# Patient Record
Sex: FEMALE | Race: WHITE | NOT HISPANIC OR LATINO | Employment: FULL TIME | URBAN - METROPOLITAN AREA
[De-identification: names, ages, dates, MRNs, and addresses within clinical notes are randomized per-mention and may not be internally consistent; named-entity substitution may affect disease eponyms.]

---

## 2024-12-01 ENCOUNTER — APPOINTMENT (OUTPATIENT)
Dept: CT IMAGING | Facility: HOSPITAL | Age: 44
DRG: 439 | End: 2024-12-01
Payer: COMMERCIAL

## 2024-12-01 ENCOUNTER — HOSPITAL ENCOUNTER (INPATIENT)
Facility: HOSPITAL | Age: 44
LOS: 5 days | Discharge: HOME | DRG: 439 | End: 2024-12-06
Attending: STUDENT IN AN ORGANIZED HEALTH CARE EDUCATION/TRAINING PROGRAM | Admitting: HOSPITALIST
Payer: COMMERCIAL

## 2024-12-01 DIAGNOSIS — K85.90 ACUTE PANCREATITIS, UNSPECIFIED COMPLICATION STATUS, UNSPECIFIED PANCREATITIS TYPE: Primary | ICD-10-CM

## 2024-12-01 PROBLEM — I10 HTN (HYPERTENSION): Status: ACTIVE | Noted: 2024-12-01

## 2024-12-01 PROBLEM — E66.01 OBESITY, CLASS III, BMI 40-49.9 (MORBID OBESITY): Status: ACTIVE | Noted: 2024-12-01

## 2024-12-01 LAB
ALBUMIN SERPL-MCNC: 3.7 G/DL (ref 3.5–5.2)
ALBUMIN/GLOB SERPL: 1.2 G/DL
ALP SERPL-CCNC: 177 U/L (ref 39–117)
ALT SERPL W P-5'-P-CCNC: 181 U/L (ref 1–33)
ANION GAP SERPL CALCULATED.3IONS-SCNC: 9.3 MMOL/L (ref 5–15)
AST SERPL-CCNC: 212 U/L (ref 1–32)
BASOPHILS # BLD AUTO: 0.02 10*3/MM3 (ref 0–0.2)
BASOPHILS NFR BLD AUTO: 0.2 % (ref 0–1.5)
BILIRUB SERPL-MCNC: 1.5 MG/DL (ref 0–1.2)
BUN SERPL-MCNC: 10 MG/DL (ref 6–20)
BUN/CREAT SERPL: 11.5 (ref 7–25)
CALCIUM SPEC-SCNC: 9.1 MG/DL (ref 8.6–10.5)
CHLORIDE SERPL-SCNC: 102 MMOL/L (ref 98–107)
CO2 SERPL-SCNC: 25.7 MMOL/L (ref 22–29)
CREAT SERPL-MCNC: 0.87 MG/DL (ref 0.57–1)
DEPRECATED RDW RBC AUTO: 42.1 FL (ref 37–54)
EGFRCR SERPLBLD CKD-EPI 2021: 84.9 ML/MIN/1.73
EOSINOPHIL # BLD AUTO: 0.01 10*3/MM3 (ref 0–0.4)
EOSINOPHIL NFR BLD AUTO: 0.1 % (ref 0.3–6.2)
ERYTHROCYTE [DISTWIDTH] IN BLOOD BY AUTOMATED COUNT: 12.3 % (ref 12.3–15.4)
GLOBULIN UR ELPH-MCNC: 3.1 GM/DL
GLUCOSE SERPL-MCNC: 120 MG/DL (ref 65–99)
HCT VFR BLD AUTO: 45.3 % (ref 34–46.6)
HGB BLD-MCNC: 14.9 G/DL (ref 12–15.9)
IMM GRANULOCYTES # BLD AUTO: 0.03 10*3/MM3 (ref 0–0.05)
IMM GRANULOCYTES NFR BLD AUTO: 0.3 % (ref 0–0.5)
LIPASE SERPL-CCNC: 2527 U/L (ref 13–60)
LYMPHOCYTES # BLD AUTO: 1.48 10*3/MM3 (ref 0.7–3.1)
LYMPHOCYTES NFR BLD AUTO: 14.8 % (ref 19.6–45.3)
MCH RBC QN AUTO: 30.2 PG (ref 26.6–33)
MCHC RBC AUTO-ENTMCNC: 32.9 G/DL (ref 31.5–35.7)
MCV RBC AUTO: 91.7 FL (ref 79–97)
MONOCYTES # BLD AUTO: 0.49 10*3/MM3 (ref 0.1–0.9)
MONOCYTES NFR BLD AUTO: 4.9 % (ref 5–12)
NEUTROPHILS NFR BLD AUTO: 7.98 10*3/MM3 (ref 1.7–7)
NEUTROPHILS NFR BLD AUTO: 79.7 % (ref 42.7–76)
PLATELET # BLD AUTO: 340 10*3/MM3 (ref 140–450)
PMV BLD AUTO: 10 FL (ref 6–12)
POTASSIUM SERPL-SCNC: 3.5 MMOL/L (ref 3.5–5.2)
PROT SERPL-MCNC: 6.8 G/DL (ref 6–8.5)
RBC # BLD AUTO: 4.94 10*6/MM3 (ref 3.77–5.28)
SODIUM SERPL-SCNC: 137 MMOL/L (ref 136–145)
WBC NRBC COR # BLD AUTO: 10.01 10*3/MM3 (ref 3.4–10.8)

## 2024-12-01 PROCEDURE — 99285 EMERGENCY DEPT VISIT HI MDM: CPT

## 2024-12-01 PROCEDURE — 25010000002 HYDROMORPHONE PER 4 MG: Performed by: NURSE PRACTITIONER

## 2024-12-01 PROCEDURE — 85025 COMPLETE CBC W/AUTO DIFF WBC: CPT | Performed by: NURSE PRACTITIONER

## 2024-12-01 PROCEDURE — 74176 CT ABD & PELVIS W/O CONTRAST: CPT

## 2024-12-01 PROCEDURE — 25810000003 SODIUM CHLORIDE 0.9 % SOLUTION: Performed by: HOSPITALIST

## 2024-12-01 PROCEDURE — 83690 ASSAY OF LIPASE: CPT | Performed by: NURSE PRACTITIONER

## 2024-12-01 PROCEDURE — 25010000002 ONDANSETRON PER 1 MG: Performed by: NURSE PRACTITIONER

## 2024-12-01 PROCEDURE — 99285 EMERGENCY DEPT VISIT HI MDM: CPT | Performed by: NURSE PRACTITIONER

## 2024-12-01 PROCEDURE — 25010000002 ONDANSETRON PER 1 MG: Performed by: HOSPITALIST

## 2024-12-01 PROCEDURE — 25010000002 HYDROMORPHONE PER 4 MG: Performed by: HOSPITALIST

## 2024-12-01 PROCEDURE — 80053 COMPREHEN METABOLIC PANEL: CPT | Performed by: NURSE PRACTITIONER

## 2024-12-01 PROCEDURE — 36415 COLL VENOUS BLD VENIPUNCTURE: CPT

## 2024-12-01 RX ORDER — LOSARTAN POTASSIUM 100 MG/1
100 TABLET ORAL DAILY
COMMUNITY

## 2024-12-01 RX ORDER — HYDROMORPHONE HYDROCHLORIDE 1 MG/ML
0.5 INJECTION, SOLUTION INTRAMUSCULAR; INTRAVENOUS; SUBCUTANEOUS
Status: DISCONTINUED | OUTPATIENT
Start: 2024-12-01 | End: 2024-12-06 | Stop reason: HOSPADM

## 2024-12-01 RX ORDER — BISACODYL 5 MG/1
5 TABLET, DELAYED RELEASE ORAL DAILY PRN
Status: DISCONTINUED | OUTPATIENT
Start: 2024-12-01 | End: 2024-12-06 | Stop reason: HOSPADM

## 2024-12-01 RX ORDER — ACETAMINOPHEN 650 MG/1
650 SUPPOSITORY RECTAL EVERY 4 HOURS PRN
Status: DISCONTINUED | OUTPATIENT
Start: 2024-12-01 | End: 2024-12-06 | Stop reason: HOSPADM

## 2024-12-01 RX ORDER — HYDROCHLOROTHIAZIDE 12.5 MG/1
12.5 TABLET ORAL DAILY
COMMUNITY

## 2024-12-01 RX ORDER — PANTOPRAZOLE SODIUM 40 MG/1
40 TABLET, DELAYED RELEASE ORAL DAILY
Status: DISCONTINUED | OUTPATIENT
Start: 2024-12-01 | End: 2024-12-04

## 2024-12-01 RX ORDER — ONDANSETRON 2 MG/ML
4 INJECTION INTRAMUSCULAR; INTRAVENOUS ONCE
Status: COMPLETED | OUTPATIENT
Start: 2024-12-01 | End: 2024-12-01

## 2024-12-01 RX ORDER — PANTOPRAZOLE SODIUM 20 MG/1
20 TABLET, DELAYED RELEASE ORAL DAILY
Status: DISCONTINUED | OUTPATIENT
Start: 2024-12-01 | End: 2024-12-01 | Stop reason: CLARIF

## 2024-12-01 RX ORDER — AMOXICILLIN 250 MG
2 CAPSULE ORAL 2 TIMES DAILY PRN
Status: DISCONTINUED | OUTPATIENT
Start: 2024-12-01 | End: 2024-12-06 | Stop reason: HOSPADM

## 2024-12-01 RX ORDER — HYDROMORPHONE HYDROCHLORIDE 1 MG/ML
0.5 INJECTION, SOLUTION INTRAMUSCULAR; INTRAVENOUS; SUBCUTANEOUS ONCE
Status: COMPLETED | OUTPATIENT
Start: 2024-12-01 | End: 2024-12-01

## 2024-12-01 RX ORDER — SODIUM CHLORIDE 9 MG/ML
100 INJECTION, SOLUTION INTRAVENOUS CONTINUOUS
Status: ACTIVE | OUTPATIENT
Start: 2024-12-01 | End: 2024-12-02

## 2024-12-01 RX ORDER — ONDANSETRON 4 MG/1
4 TABLET, ORALLY DISINTEGRATING ORAL EVERY 6 HOURS PRN
Status: DISCONTINUED | OUTPATIENT
Start: 2024-12-01 | End: 2024-12-06 | Stop reason: HOSPADM

## 2024-12-01 RX ORDER — POLYETHYLENE GLYCOL 3350 17 G/17G
17 POWDER, FOR SOLUTION ORAL DAILY PRN
Status: DISCONTINUED | OUTPATIENT
Start: 2024-12-01 | End: 2024-12-06 | Stop reason: HOSPADM

## 2024-12-01 RX ORDER — SODIUM CHLORIDE 0.9 % (FLUSH) 0.9 %
10 SYRINGE (ML) INJECTION AS NEEDED
Status: DISCONTINUED | OUTPATIENT
Start: 2024-12-01 | End: 2024-12-06 | Stop reason: HOSPADM

## 2024-12-01 RX ORDER — ALUMINA, MAGNESIA, AND SIMETHICONE 2400; 2400; 240 MG/30ML; MG/30ML; MG/30ML
30 SUSPENSION ORAL ONCE
Status: COMPLETED | OUTPATIENT
Start: 2024-12-01 | End: 2024-12-01

## 2024-12-01 RX ORDER — SODIUM CHLORIDE 9 MG/ML
40 INJECTION, SOLUTION INTRAVENOUS AS NEEDED
Status: DISCONTINUED | OUTPATIENT
Start: 2024-12-01 | End: 2024-12-06 | Stop reason: HOSPADM

## 2024-12-01 RX ORDER — BISACODYL 10 MG
10 SUPPOSITORY, RECTAL RECTAL DAILY PRN
Status: DISCONTINUED | OUTPATIENT
Start: 2024-12-01 | End: 2024-12-06 | Stop reason: HOSPADM

## 2024-12-01 RX ORDER — NALOXONE HCL 0.4 MG/ML
0.4 VIAL (ML) INJECTION
Status: DISCONTINUED | OUTPATIENT
Start: 2024-12-01 | End: 2024-12-06 | Stop reason: HOSPADM

## 2024-12-01 RX ORDER — SODIUM CHLORIDE 0.9 % (FLUSH) 0.9 %
10 SYRINGE (ML) INJECTION EVERY 12 HOURS SCHEDULED
Status: DISCONTINUED | OUTPATIENT
Start: 2024-12-01 | End: 2024-12-06 | Stop reason: HOSPADM

## 2024-12-01 RX ORDER — ACETAMINOPHEN 160 MG/5ML
650 SOLUTION ORAL EVERY 4 HOURS PRN
Status: DISCONTINUED | OUTPATIENT
Start: 2024-12-01 | End: 2024-12-06 | Stop reason: HOSPADM

## 2024-12-01 RX ORDER — ACETAMINOPHEN 325 MG/1
650 TABLET ORAL EVERY 4 HOURS PRN
Status: DISCONTINUED | OUTPATIENT
Start: 2024-12-01 | End: 2024-12-06 | Stop reason: HOSPADM

## 2024-12-01 RX ORDER — TIRZEPATIDE 2.5 MG/.5ML
2.5 INJECTION, SOLUTION SUBCUTANEOUS WEEKLY
COMMUNITY

## 2024-12-01 RX ORDER — ONDANSETRON 2 MG/ML
4 INJECTION INTRAMUSCULAR; INTRAVENOUS EVERY 6 HOURS PRN
Status: DISCONTINUED | OUTPATIENT
Start: 2024-12-01 | End: 2024-12-06 | Stop reason: HOSPADM

## 2024-12-01 RX ADMIN — HYDROMORPHONE HYDROCHLORIDE 0.5 MG: 1 INJECTION, SOLUTION INTRAMUSCULAR; INTRAVENOUS; SUBCUTANEOUS at 17:36

## 2024-12-01 RX ADMIN — ONDANSETRON 4 MG: 2 INJECTION, SOLUTION INTRAMUSCULAR; INTRAVENOUS at 17:28

## 2024-12-01 RX ADMIN — ALUMINUM HYDROXIDE, MAGNESIUM HYDROXIDE, AND DIMETHICONE 30 ML: 400; 400; 40 SUSPENSION ORAL at 10:49

## 2024-12-01 RX ADMIN — SODIUM CHLORIDE 100 ML/HR: 9 INJECTION, SOLUTION INTRAVENOUS at 15:20

## 2024-12-01 RX ADMIN — ONDANSETRON 4 MG: 2 INJECTION, SOLUTION INTRAMUSCULAR; INTRAVENOUS at 12:02

## 2024-12-01 RX ADMIN — HYDROMORPHONE HYDROCHLORIDE 0.5 MG: 1 INJECTION, SOLUTION INTRAMUSCULAR; INTRAVENOUS; SUBCUTANEOUS at 11:59

## 2024-12-01 RX ADMIN — HYDROMORPHONE HYDROCHLORIDE 0.5 MG: 1 INJECTION, SOLUTION INTRAMUSCULAR; INTRAVENOUS; SUBCUTANEOUS at 15:20

## 2024-12-01 RX ADMIN — PANTOPRAZOLE SODIUM 40 MG: 40 TABLET, DELAYED RELEASE ORAL at 20:02

## 2024-12-01 NOTE — FSED PROVIDER NOTE
Subjective   History of Present Illness  44 yr old female presents with C/O ABD pain.  She has been having ABD pain off and on for several months.  Her gallbladder has already been removed, but it feels like the pain she had with her gallbladder.   Seen in ER 11 days ago for same complaint.  States that pain is getting worse.  Has not made appointment yet with GI doctor. Has nausea and vomiting off and on.  Denies fever or chills.  No Diarrhea.  States GI cocktail given previously in ER helped some.  Pain in mid epigastric region and goes through to her back.  Feels tired.  Can't get comfortable.  Pt is on monjaro, but stated she has had these GI issues before she started on it.        Review of Systems   Constitutional: Negative.    HENT: Negative.     Respiratory: Negative.     Gastrointestinal:  Positive for abdominal pain, nausea and vomiting. Negative for diarrhea.   Genitourinary: Negative.        History reviewed. No pertinent past medical history.    Allergies   Allergen Reactions    Penicillins Hives       Past Surgical History:   Procedure Laterality Date    CHOLECYSTECTOMY         History reviewed. No pertinent family history.    Social History     Socioeconomic History    Marital status:    Tobacco Use    Smoking status: Never   Substance and Sexual Activity    Alcohol use: Not Currently           Objective   Physical Exam  Vitals and nursing note reviewed.   Constitutional:       Appearance: She is normal weight.   Pulmonary:      Effort: Pulmonary effort is normal.      Breath sounds: Normal breath sounds and air entry.   Abdominal:      General: Abdomen is protuberant. Bowel sounds are normal.      Palpations: Abdomen is soft.      Tenderness: There is abdominal tenderness in the epigastric area. There is no right CVA tenderness, left CVA tenderness, guarding or rebound. Negative signs include Schmidt's sign and Rovsing's sign.   Musculoskeletal:        Back:       Comments: Pain is in mid back,  but not tender and no pain with palation.   Skin:     General: Skin is warm and dry.      Capillary Refill: Capillary refill takes less than 2 seconds.   Neurological:      Mental Status: She is alert.         Procedures           ED Course  ED Course as of 12/01/24 1907   Sun Dec 01, 2024   1336 Discussed findings with patient. [JJ]      ED Course User Index  [JJ] Linette Haro APRN      Decision to admit.  Dr Lee at [facility].                                     Medical Decision Making  Diff DX:  ABD pain, gastritis, kidney infection, appendicitis, pancrease, ulcer    Problems Addressed:  Acute pancreatitis, unspecified complication status, unspecified pancreatitis type: complicated acute illness or injury    Amount and/or Complexity of Data Reviewed  Labs: ordered.  Radiology: ordered.    Risk  OTC drugs.  Prescription drug management.  Decision regarding hospitalization.        Final diagnoses:   Acute pancreatitis, unspecified complication status, unspecified pancreatitis type       ED Disposition  ED Disposition       ED Disposition   Decision to Admit    Condition   --    Comment   Level of Care: Med/Surg [1]   Diagnosis: Pancreatitis [202663]   Admitting Physician: DARION LEE [[id number]]   Attending Physician: DARION LEE [[id number]]   Isolate for COVID?: No [0]   Certification: I Certify That Inpatient Hospital Services Are Medically Necessary For Greater Than 2 Midnights                 No follow-up provider specified.       Medication List      No changes were made to your prescriptions during this visit.

## 2024-12-01 NOTE — H&P
HISTORY AND PHYSICAL   [facility]        Patient Identification:  Name: Racquel Degroot  Age: 44 y.o.  Sex: female  :  1980  MRN: [id number]                     Primary Care Physician: Pretty Dean MD    Chief Complaint:  abdominal pain    History of Present Illness:       The patient 44 yr old female presents with complaining of ABD pain. She has been having ABD pain off and on for several months. Her gallbladder has already been removed, but it feels like the pain she had with her gallbladder. Seen in ER 11 days ago for same complaint. States that pain is getting worse. Has not made appointment yet with GI doctor. Has nausea and vomiting off and on. Denies fever or chills. No Diarrhea. States GI cocktail given previously in ER helped some. Pain in mid epigastric region and goes through to her back. Feels tired. Can't get comfortable. Pt is on monjaro, but stated she has had these GI issues before she started on it.  The patient was evaluated at the ER on [facility] and transferred to [facility] for further evaluation treatment with suspected pancreatitis with elevated lipase.  CT of the abdomen did show some dilated biliary ducts.  She reported that she did have stones when she had her gallbladder out but it has been many years ago.    Past Medical History:  History reviewed. No pertinent past medical history.  Past Surgical History:  Past Surgical History:   Procedure Laterality Date    CHOLECYSTECTOMY        Home Meds:  Medications Prior to Admission   Medication Sig Dispense Refill Last Dose/Taking    aluminum-magnesium hydroxide-simethicone (MAALOX) 200-200-20 MG/5ML suspension Take 30 mL by mouth 4 (Four) Times a Day Before Meals & at Bedtime. 1680 mL 0 Taking    hydroCHLOROthiazide 12.5 MG tablet Take 1 tablet by mouth Daily.   Taking    losartan (COZAAR) 100 MG tablet Take 1 tablet by mouth Daily.   Taking    Norgestim-Eth Estrad Triphasic (TRI-SPRINTEC PO)  Take  by mouth Daily.   Taking    pantoprazole (PROTONIX) 20 MG EC tablet Take 1 tablet by mouth Daily for 30 days. 30 tablet 0 Taking    Tirzepatide-Weight Management (Zepbound) 2.5 MG/0.5ML solution auto-injector Inject 0.5 mL under the skin into the appropriate area as directed 1 (One) Time Per Week. Takes on Friday   Taking     Current meds    Current Facility-Administered Medications:     acetaminophen (TYLENOL) tablet 650 mg, 650 mg, Oral, Q4H PRN **OR** acetaminophen (TYLENOL) 160 MG/5ML oral solution 650 mg, 650 mg, Oral, Q4H PRN **OR** acetaminophen (TYLENOL) suppository 650 mg, 650 mg, Rectal, Q4H PRN, Vitaly Lee MD    sennosides-docusate (PERICOLACE) 8.6-50 MG per tablet 2 tablet, 2 tablet, Oral, BID PRN **AND** polyethylene glycol (MIRALAX) packet 17 g, 17 g, Oral, Daily PRN **AND** bisacodyl (DULCOLAX) EC tablet 5 mg, 5 mg, Oral, Daily PRN **AND** bisacodyl (DULCOLAX) suppository 10 mg, 10 mg, Rectal, Daily PRN, Vitaly Lee MD    HYDROmorphone (DILAUDID) injection 0.5 mg, 0.5 mg, Intravenous, Q2H PRN, 0.5 mg at 12/01/24 1520 **AND** naloxone (NARCAN) injection 0.4 mg, 0.4 mg, Intravenous, Q5 Min PRN, Vitaly Lee MD    ondansetron ODT (ZOFRAN-ODT) disintegrating tablet 4 mg, 4 mg, Oral, Q6H PRN **OR** ondansetron (ZOFRAN) injection 4 mg, 4 mg, Intravenous, Q6H PRN, Vitaly Lee MD    pantoprazole (PROTONIX) EC tablet 20 mg, 20 mg, Oral, Daily, Vitaly Lee MD    sodium chloride 0.9 % flush 10 mL, 10 mL, Intravenous, Q12H, Vitaly Lee MD    sodium chloride 0.9 % flush 10 mL, 10 mL, Intravenous, PRN, Vitaly Lee MD    sodium chloride 0.9 % infusion 40 mL, 40 mL, Intravenous, PRN, Vitaly Lee MD    sodium chloride 0.9 % infusion, 100 mL/hr, Intravenous, Continuous, Vitaly Lee MD, Last Rate: 100 mL/hr at 12/01/24 1520, 100 mL/hr at 12/01/24 1520  Allergies:  Allergies   Allergen Reactions    Penicillins Hives     Immunizations:    There is no immunization history on file for this  "patient.  Social History:   Social History     Social History Narrative    Not on file     Social History     Socioeconomic History    Marital status:    Tobacco Use    Smoking status: Never   Substance and Sexual Activity    Alcohol use: Not Currently       Family History:  History reviewed. No pertinent family history.     Review of Systems  See history of present illness and past medical history.  Patient denies headache, dizziness, syncope, falls, trauma, change in vision, change in hearing, change in taste, changes in weight, changes in appetite, focal weakness, numbness, or paresthesia.  Patient denies chest pain, palpitations, dyspnea, orthopnea, PND, cough, sinus pressure, rhinorrhea, epistaxis, hemoptysis, nausea, vomiting, hematemesis, diarrhea, constipation or hematochezia. Denies cold or heat intolerance, polydipsia, polyuria, polyphagia. Denies hematuria, pyuria, dysuria, hesitancy, frequency or urgency.   Denies fever, chills, sweats, night sweats.  Denies missing any routine medications. Remainder of ROS is negative.    Objective:  tMax 24 hrs: Temp (24hrs), Av.9 °F (36.6 °C), Min:96.7 °F (35.9 °C), Max:98.7 °F (37.1 °C)    Vitals Ranges:   Temp:  [96.7 °F (35.9 °C)-98.7 °F (37.1 °C)] 96.7 °F (35.9 °C)  Heart Rate:  [65-99] 72  Resp:  [16-18] 18  BP: (147-152)/(80-99) 150/92      Exam:  /92 (BP Location: Left arm, Patient Position: Sitting)   Pulse 72   Temp 96.7 °F (35.9 °C) (Oral)   Resp 18   Ht 164.6 cm (64.8\")   Wt 127 kg (280 lb 13.9 oz)   LMP 10/24/2024   SpO2 97%   BMI 47.03 kg/m²     General Appearance:    Alert, cooperative, no distress, appears stated age   Head:    Normocephalic, without obvious abnormality, atraumatic   Eyes:    PERRL, conjunctivae/corneas clear, EOM's intact, both eyes   Ears:    Normal external ear canals, both ears   Nose:   Nares normal, septum midline, mucosa normal, no drainage    or sinus tenderness   Throat:   Lips, mucosa, and tongue " normal   Neck:   Supple, symmetrical, trachea midline, no adenopathy;     thyroid:  no enlargement/tenderness/nodules; no carotid    bruit or JVD   Back:     Symmetric, no curvature, ROM normal, no CVA tenderness   Lungs:     Clear to auscultation bilaterally, respirations unlabored   Chest Wall:    No tenderness or deformity    Heart:    Regular rate and rhythm, S1 and S2 normal, no murmur, rub   or gallop   Abdomen:     Soft, mild epigastric tenderness, bowel sounds active all four quadrants,     no masses, no hepatomegaly, no splenomegaly   Extremities:   Extremities normal, atraumatic, no cyanosis or edema   Pulses:   2+ and symmetric all extremities   Skin:   Skin color, texture, turgor normal, no rashes or lesions   Lymph nodes:   Cervical, supraclavicular, and axillary nodes normal   Neurologic:   CNII-XII intact, normal strength, sensation intact throughout      .    Data Review:  Lab Results (last 72 hours)       Procedure Component Value Units Date/Time    CBC & Differential [[id number]]  (Abnormal) Collected: 12/01/24 1205    Specimen: Blood Updated: 12/01/24 1208    Narrative:      The following orders were created for panel order CBC & Differential.  Procedure                               Abnormality         Status                     ---------                               -----------         ------                     CBC Auto Differential[[id number]]        Abnormal            Final result                 Please view results for these tests on the individual orders.    CBC Auto Differential [[id number]]  (Abnormal) Collected: 12/01/24 1205    Specimen: Blood Updated: 12/01/24 1208     WBC 10.01 10*3/mm3      RBC 4.94 10*6/mm3      Hemoglobin 14.9 g/dL      Hematocrit 45.3 %      MCV 91.7 fL      MCH 30.2 pg      MCHC 32.9 g/dL      RDW 12.3 %      RDW-SD 42.1 fl      MPV 10.0 fL      Platelets 340 10*3/mm3      Neutrophil % 79.7 %      Lymphocyte % 14.8 %      Monocyte % 4.9 %      Eosinophil % 0.1 %       Basophil % 0.2 %      Immature Grans % 0.3 %      Neutrophils, Absolute 7.98 10*3/mm3      Lymphocytes, Absolute 1.48 10*3/mm3      Monocytes, Absolute 0.49 10*3/mm3      Eosinophils, Absolute 0.01 10*3/mm3      Basophils, Absolute 0.02 10*3/mm3      Immature Grans, Absolute 0.03 10*3/mm3     Comprehensive Metabolic Panel [[id number]]  (Abnormal) Collected: 12/01/24 1035    Specimen: Blood Updated: 12/01/24 1055     Glucose 120 mg/dL      BUN 10 mg/dL      Creatinine 0.87 mg/dL      Sodium 137 mmol/L      Potassium 3.5 mmol/L      Chloride 102 mmol/L      CO2 25.7 mmol/L      Calcium 9.1 mg/dL      Total Protein 6.8 g/dL      Albumin 3.7 g/dL      ALT (SGPT) 181 U/L      AST (SGOT) 212 U/L      Alkaline Phosphatase 177 U/L      Total Bilirubin 1.5 mg/dL      Globulin 3.1 gm/dL      A/G Ratio 1.2 g/dL      BUN/Creatinine Ratio 11.5     Anion Gap 9.3 mmol/L      eGFR 84.9 mL/min/1.73     Narrative:      GFR Normal >60  Chronic Kidney Disease <60  Kidney Failure <15      Lipase [[id number]]  (Abnormal) Collected: 12/01/24 1035    Specimen: Blood Updated: 12/01/24 1055     Lipase 2,527 U/L                      Imaging Results (All)       Procedure Component Value Units Date/Time    CT Abdomen Pelvis Without Contrast [[id number]] Collected: 12/01/24 1137     Updated: 12/01/24 1151    Narrative:      CT ABDOMEN AND PELVIS WITHOUT IV CONTRAST     HISTORY: Abdominal pain     TECHNIQUE: Radiation dose reduction techniques were utilized, including  automated exposure control and exposure modulation based on body size.   3 mm images were obtained through the abdomen and pelvis without IV  contrast.     COMPARISON: None     FINDINGS:  Sub-6 mm pulm nodule in the right middle lobe. No findings of small  bowel obstruction. The appendix unremarkable.     The liver, pancreas, spleen, adrenal glands and kidneys have an  unremarkable noncontrast CT appearance. No hydronephrosis no abdominal  pelvic adenopathy by size criteria.  Bladder is decompressed and cannot  be evaluated. No free intraperitoneal air is seen. No suspicious lytic  or blastic osseous lesions       Impression:      1.  No measurable loculated peripancreatic fluid collection is seen.  However, please note evaluation is suboptimal without intravenous  contrast. Mild to moderate intra and extra medical duct dilation status  post cholecystectomy. While findings may be post surgical, they remain  indeterminant given patient's elevated liver enzymes and lipase levels  and biliary obstruction cannot be excluded. Further evaluation with  abdominal MRI and MRCP should be considered if there is clinical concern  for biliary obstruction..  2.  If this patient is at increased risk for lung cancer, follow-up  chest CT in 12 months can be considered to ensure stability. If this  patient is not at increased risk for lung cancer, no further follow-up  is necessary per Fleischner criteria.  3.  Other findings as above           This report was finalized on 12/1/2024 11:48 AM by Dr. Deni Marie M.D on Workstation: [id number]             History reviewed. No pertinent past medical history.    Assessment:  Active Hospital Problems    Diagnosis  POA    **Pancreatitis [K85.90]  Yes    HTN (hypertension) [I10]  Unknown    Obesity, Class III, BMI 40-49.9 (morbid obesity) [E66.01]  Unknown      Resolved Hospital Problems   No resolved problems to display.       Plan:  Will make her n.p.o. and give IV fluid for hydration with pain and nausea medicine.  Will get MRI MRCP of pancreas.  Follow-up on labs.  Will ask for GI consult.    Vitaly Lee MD  12/1/2024  15:43 EST

## 2024-12-02 ENCOUNTER — APPOINTMENT (OUTPATIENT)
Dept: MRI IMAGING | Facility: HOSPITAL | Age: 44
DRG: 439 | End: 2024-12-02
Payer: COMMERCIAL

## 2024-12-02 LAB
ALBUMIN SERPL-MCNC: 3.3 G/DL (ref 3.5–5.2)
ALBUMIN/GLOB SERPL: 1.2 G/DL
ALP SERPL-CCNC: 190 U/L (ref 39–117)
ALT SERPL W P-5'-P-CCNC: 271 U/L (ref 1–33)
ANION GAP SERPL CALCULATED.3IONS-SCNC: 8.7 MMOL/L (ref 5–15)
AST SERPL-CCNC: 357 U/L (ref 1–32)
BASOPHILS # BLD AUTO: 0.05 10*3/MM3 (ref 0–0.2)
BASOPHILS NFR BLD AUTO: 0.5 % (ref 0–1.5)
BILIRUB SERPL-MCNC: 3.2 MG/DL (ref 0–1.2)
BUN SERPL-MCNC: 9 MG/DL (ref 6–20)
BUN/CREAT SERPL: 10.6 (ref 7–25)
CALCIUM SPEC-SCNC: 8.5 MG/DL (ref 8.6–10.5)
CHLORIDE SERPL-SCNC: 106 MMOL/L (ref 98–107)
CO2 SERPL-SCNC: 25.3 MMOL/L (ref 22–29)
CREAT SERPL-MCNC: 0.85 MG/DL (ref 0.57–1)
DEPRECATED RDW RBC AUTO: 39.6 FL (ref 37–54)
EGFRCR SERPLBLD CKD-EPI 2021: 87.3 ML/MIN/1.73
EOSINOPHIL # BLD AUTO: 0.11 10*3/MM3 (ref 0–0.4)
EOSINOPHIL NFR BLD AUTO: 1.2 % (ref 0.3–6.2)
ERYTHROCYTE [DISTWIDTH] IN BLOOD BY AUTOMATED COUNT: 12.4 % (ref 12.3–15.4)
GLOBULIN UR ELPH-MCNC: 2.8 GM/DL
GLUCOSE SERPL-MCNC: 120 MG/DL (ref 65–99)
HCT VFR BLD AUTO: 39.7 % (ref 34–46.6)
HGB BLD-MCNC: 13.7 G/DL (ref 12–15.9)
IMM GRANULOCYTES # BLD AUTO: 0.02 10*3/MM3 (ref 0–0.05)
IMM GRANULOCYTES NFR BLD AUTO: 0.2 % (ref 0–0.5)
LIPASE SERPL-CCNC: 1846 U/L (ref 13–60)
LYMPHOCYTES # BLD AUTO: 2.5 10*3/MM3 (ref 0.7–3.1)
LYMPHOCYTES NFR BLD AUTO: 27 % (ref 19.6–45.3)
MCH RBC QN AUTO: 30.2 PG (ref 26.6–33)
MCHC RBC AUTO-ENTMCNC: 34.5 G/DL (ref 31.5–35.7)
MCV RBC AUTO: 87.6 FL (ref 79–97)
MONOCYTES # BLD AUTO: 0.56 10*3/MM3 (ref 0.1–0.9)
MONOCYTES NFR BLD AUTO: 6.1 % (ref 5–12)
NEUTROPHILS NFR BLD AUTO: 6.01 10*3/MM3 (ref 1.7–7)
NEUTROPHILS NFR BLD AUTO: 65 % (ref 42.7–76)
NRBC BLD AUTO-RTO: 0 /100 WBC (ref 0–0.2)
PLATELET # BLD AUTO: 323 10*3/MM3 (ref 140–450)
PMV BLD AUTO: 9.2 FL (ref 6–12)
POTASSIUM SERPL-SCNC: 3.3 MMOL/L (ref 3.5–5.2)
PROT SERPL-MCNC: 6.1 G/DL (ref 6–8.5)
RBC # BLD AUTO: 4.53 10*6/MM3 (ref 3.77–5.28)
SODIUM SERPL-SCNC: 140 MMOL/L (ref 136–145)
WBC NRBC COR # BLD AUTO: 9.25 10*3/MM3 (ref 3.4–10.8)

## 2024-12-02 PROCEDURE — 83690 ASSAY OF LIPASE: CPT | Performed by: HOSPITALIST

## 2024-12-02 PROCEDURE — 85025 COMPLETE CBC W/AUTO DIFF WBC: CPT | Performed by: HOSPITALIST

## 2024-12-02 PROCEDURE — 25010000002 PROCHLORPERAZINE 10 MG/2ML SOLUTION: Performed by: HOSPITALIST

## 2024-12-02 PROCEDURE — 25810000003 SODIUM CHLORIDE 0.9 % SOLUTION: Performed by: HOSPITALIST

## 2024-12-02 PROCEDURE — 80053 COMPREHEN METABOLIC PANEL: CPT | Performed by: HOSPITALIST

## 2024-12-02 PROCEDURE — 25010000002 ONDANSETRON PER 1 MG: Performed by: HOSPITALIST

## 2024-12-02 PROCEDURE — 25510000002 GADOBENATE DIMEGLUMINE 529 MG/ML SOLUTION: Performed by: HOSPITALIST

## 2024-12-02 PROCEDURE — A9577 INJ MULTIHANCE: HCPCS | Performed by: HOSPITALIST

## 2024-12-02 PROCEDURE — 99222 1ST HOSP IP/OBS MODERATE 55: CPT | Performed by: PHYSICIAN ASSISTANT

## 2024-12-02 PROCEDURE — 74183 MRI ABD W/O CNTR FLWD CNTR: CPT

## 2024-12-02 PROCEDURE — 25010000002 HYDROMORPHONE PER 4 MG: Performed by: HOSPITALIST

## 2024-12-02 RX ORDER — PROCHLORPERAZINE EDISYLATE 5 MG/ML
10 INJECTION INTRAMUSCULAR; INTRAVENOUS EVERY 6 HOURS PRN
Status: DISCONTINUED | OUTPATIENT
Start: 2024-12-02 | End: 2024-12-06 | Stop reason: HOSPADM

## 2024-12-02 RX ORDER — SODIUM CHLORIDE 9 MG/ML
100 INJECTION, SOLUTION INTRAVENOUS CONTINUOUS
Status: DISCONTINUED | OUTPATIENT
Start: 2024-12-02 | End: 2024-12-06 | Stop reason: HOSPADM

## 2024-12-02 RX ADMIN — HYDROMORPHONE HYDROCHLORIDE 0.5 MG: 1 INJECTION, SOLUTION INTRAMUSCULAR; INTRAVENOUS; SUBCUTANEOUS at 11:32

## 2024-12-02 RX ADMIN — Medication 10 ML: at 20:10

## 2024-12-02 RX ADMIN — SODIUM CHLORIDE 100 ML/HR: 9 INJECTION, SOLUTION INTRAVENOUS at 14:01

## 2024-12-02 RX ADMIN — ONDANSETRON 4 MG: 2 INJECTION, SOLUTION INTRAMUSCULAR; INTRAVENOUS at 14:57

## 2024-12-02 RX ADMIN — HYDROMORPHONE HYDROCHLORIDE 0.5 MG: 1 INJECTION, SOLUTION INTRAMUSCULAR; INTRAVENOUS; SUBCUTANEOUS at 03:16

## 2024-12-02 RX ADMIN — HYDROMORPHONE HYDROCHLORIDE 0.5 MG: 1 INJECTION, SOLUTION INTRAMUSCULAR; INTRAVENOUS; SUBCUTANEOUS at 06:25

## 2024-12-02 RX ADMIN — HYDROMORPHONE HYDROCHLORIDE 0.5 MG: 1 INJECTION, SOLUTION INTRAMUSCULAR; INTRAVENOUS; SUBCUTANEOUS at 14:02

## 2024-12-02 RX ADMIN — ONDANSETRON 4 MG: 2 INJECTION, SOLUTION INTRAMUSCULAR; INTRAVENOUS at 11:32

## 2024-12-02 RX ADMIN — ONDANSETRON 4 MG: 2 INJECTION, SOLUTION INTRAMUSCULAR; INTRAVENOUS at 06:20

## 2024-12-02 RX ADMIN — PROCHLORPERAZINE EDISYLATE 10 MG: 5 INJECTION INTRAMUSCULAR; INTRAVENOUS at 18:18

## 2024-12-02 RX ADMIN — GADOBENATE DIMEGLUMINE 20 ML: 529 INJECTION, SOLUTION INTRAVENOUS at 15:52

## 2024-12-02 RX ADMIN — PANTOPRAZOLE SODIUM 40 MG: 40 TABLET, DELAYED RELEASE ORAL at 09:07

## 2024-12-02 RX ADMIN — Medication 10 ML: at 09:07

## 2024-12-02 NOTE — CONSULTS
[facility]  Initial Inpatient Consult Note    Referring Provider: A    Reason for Consultation: Abdominal pain    Subjective     History of present illness:    44 y.o. female with a past medical history of hypertension who presents to [facility] ED in the setting of abdominal pain.    Surgical history includes cholecystectomy.    Patient reports she has been experiencing pain in her epigastric region on and off for the last year.  Patient reports she had some worsening symptoms approximately 1 to 2 weeks ago prompting her to go to the ED.  She was recommended to take pantoprazole daily and Maalox up to 3 times a day and was discharged home.  Patient reports she had minimal improvement with this regimen.  Patient describes the pain as sharp and occasionally burning with radiation to her back.  She reports it worsened over the weekend prompting her to come to the emergency room.  She did experience some nausea, vomiting, diarrhea at this time.  Patient has been on Zepbound for approximately the last 6 weeks.  She denies any abnormal weight loss.  She denies any alcohol use.  Her last dose of Mounjaro was this past Friday.  She reports NSAID use sparingly.    CT abdomen pelvis without IV contrast reviewed with no obvious loculated peripancreatic fluid but limited by lack of contrast.  Mild to moderate intra and extra-hepatic duct dilation S/P cholecystectomy.  Findings may be postsurgical but MRCP recommended.    Labs reviewed with elevated , , , T. bili 1.5, lipase 2527.  Normal white blood cell count.  Worsening LFTs this morning.    Patient does report her pain is improved at this morning.  No further nausea or vomiting.    Past Medical History:  History reviewed. No pertinent past medical history.  Past Surgical History:  Past Surgical History:   Procedure Laterality Date    CHOLECYSTECTOMY        Social History:   Social History     Tobacco Use    Smoking status: Never     Smokeless tobacco: Not on file   Substance Use Topics    Alcohol use: Not Currently      Family History:  History reviewed. No pertinent family history.    Home Meds:  Medications Prior to Admission   Medication Sig Dispense Refill Last Dose/Taking    aluminum-magnesium hydroxide-simethicone (MAALOX) 200-200-20 MG/5ML suspension Take 30 mL by mouth 4 (Four) Times a Day Before Meals & at Bedtime. 1680 mL 0 Taking    hydroCHLOROthiazide 12.5 MG tablet Take 1 tablet by mouth Daily.   Taking    losartan (COZAAR) 100 MG tablet Take 1 tablet by mouth Daily.   Taking    Norgestim-Eth Estrad Triphasic (TRI-SPRINTEC PO) Take  by mouth Daily.   Taking    pantoprazole (PROTONIX) 20 MG EC tablet Take 1 tablet by mouth Daily for 30 days. 30 tablet 0 Taking    Tirzepatide-Weight Management (Zepbound) 2.5 MG/0.5ML solution auto-injector Inject 0.5 mL under the skin into the appropriate area as directed 1 (One) Time Per Week. Takes on Friday   Taking     Current Meds:   pantoprazole, 40 mg, Oral, Daily  sodium chloride, 10 mL, Intravenous, Q12H      Allergies:  Allergies   Allergen Reactions    Penicillins Hives       Objective     Vital Signs  Temp:  [96.7 °F (35.9 °C)-98.7 °F (37.1 °C)] 97.4 °F (36.3 °C)  Heart Rate:  [65-99] 74  Resp:  [16-18] 16  BP: (119-152)/(73-99) 119/73  Physical Exam:  General Appearance:     Alert, cooperative, in no acute distress   Abdomen:     Normal bowel sounds, no masses, no organomegaly, soft     nontender, nondistended, no guarding, no rebound                 tenderness   Rectal:     Deferred       Results Review:   I reviewed the patient's new clinical results.  I reviewed the patient's new imaging results and agree with the interpretation.    Results from last 7 days   Lab Units 12/02/24  0700 12/01/24  1205   WBC 10*3/mm3 9.25 10.01   HEMOGLOBIN g/dL 13.7 14.9   HEMATOCRIT % 39.7 45.3   PLATELETS 10*3/mm3 323 340     Results from last 7 days   Lab Units 12/02/24  0700 12/01/24  1035   SODIUM  mmol/L 140 137   POTASSIUM mmol/L 3.3* 3.5   CHLORIDE mmol/L 106 102   CO2 mmol/L 25.3 25.7   BUN mg/dL 9 10   CREATININE mg/dL 0.85 0.87   CALCIUM mg/dL 8.5* 9.1   BILIRUBIN mg/dL 3.2* 1.5*   ALK PHOS U/L 190* 177*   ALT (SGPT) U/L 271* 181*   AST (SGOT) U/L 357* 212*   GLUCOSE mg/dL 120* 120*         Lab Results   Lab Value Date/Time    LIPASE 1,846 (H) 12/02/2024 0700    LIPASE 2,527 (H) 12/01/2024 1035       Radiology:  CT Abdomen Pelvis Without Contrast   Final Result   1.  No measurable loculated peripancreatic fluid collection is seen.   However, please note evaluation is suboptimal without intravenous   contrast. Mild to moderate intra and extra medical duct dilation status   post cholecystectomy. While findings may be post surgical, they remain   indeterminant given patient's elevated liver enzymes and lipase levels   and biliary obstruction cannot be excluded. Further evaluation with   abdominal MRI and MRCP should be considered if there is clinical concern   for biliary obstruction..   2.  If this patient is at increased risk for lung cancer, follow-up   chest CT in 12 months can be considered to ensure stability. If this   patient is not at increased risk for lung cancer, no further follow-up   is necessary per Fleischner criteria.   3.  Other findings as above               This report was finalized on 12/1/2024 11:48 AM by Dr. Deni Marie M.D on Workstation: [id number]          MRI abdomen w wo contrast mrcp    (Results Pending)       Assessment & Plan   Assessment:   Epigastric abdominal pain  Elevated lipase - findings consistent with acute pancreatitis   Elevated LFTs   CT with mild to moderate intra and extra hepatic ductal dilation s/p CCY   Mounjaro therapy     Plan:   Continue supportive care   Await MRCP to r/o biliary obstruction  Likely biliary etiology but medication induced pancreatitis is a possibility - patient is on both mounjaro and HCTZ   NPO for now    I discussed the patients  findings and my recommendations with patient.    Dictated utilizing Dragon dictation.         Latonia Joaquin PA-C   [facility]  [address] [place] [address]  Office: [phone]

## 2024-12-02 NOTE — PROGRESS NOTES
Continued Stay Note  [facility]     Patient Name: Racquel Degroot  MRN: [id number]  Today's Date: 12/2/2024    Admit Date: 12/1/2024    Plan: Home no needs   Discharge Plan       Row Name 12/02/24 1430       Plan    Plan Home no needs    Plan Comments Introduced self and role of CCP. Patient confirmed DC plan is to return to home. Stated she is independent with ADL's and uses no DMEs. Family will assist as needed and will provide transportation at DC. Denies any needs/equipment.                   Discharge Codes    No documentation.                       Jadyn Mahoney RN

## 2024-12-02 NOTE — PLAN OF CARE
Goal Outcome Evaluation:  Plan of Care Reviewed With: patient        Progress: no change  Outcome Evaluation: freq nausea and occasional emesis. c/o mid-epigastric pain described as deep and burning, thru to her back. up ad jessica and amb in [place]. NPO for MRCP,  IVF restarted. family attentive at bs.

## 2024-12-02 NOTE — PLAN OF CARE
Goal Outcome Evaluation:  Arrived to unit at 1415, NPO, medicated for pain x 2 with relief, vomited, anable to assess amount, zofran given x 1 with relief, GI to see tomorrow, no SCD pump available, accumax placed on bed,  Plan of Care Reviewed With: patient

## 2024-12-02 NOTE — PROGRESS NOTES
"DAILY PROGRESS NOTE  [facility]    Patient Identification:  Name: Racquel Degroot  Age: 44 y.o.  Sex: female  :  1980  MRN: [id number]         Primary Care Physician: Pretty Dean MD    Subjective:  Interval History: She continues to have abdominal pain with nausea and vomiting.    Objective:    Scheduled Meds:pantoprazole, 40 mg, Oral, Daily  sodium chloride, 10 mL, Intravenous, Q12H      Continuous Infusions:     Vital signs in last 24 hours:  Temp:  [96.7 °F (35.9 °C)-98 °F (36.7 °C)] 98 °F (36.7 °C)  Heart Rate:  [72-92] 79  Resp:  [16-18] 18  BP: (110-150)/(73-92) 139/81    Intake/Output:    Intake/Output Summary (Last 24 hours) at 2024 1352  Last data filed at 2024 1030  Gross per 24 hour   Intake --   Output 400 ml   Net -400 ml       Exam:  /81 (BP Location: Right arm, Patient Position: Sitting)   Pulse 79   Temp 98 °F (36.7 °C) (Oral)   Resp 18   Ht 164.6 cm (64.8\")   Wt 127 kg (280 lb 13.9 oz)   LMP 10/24/2024   SpO2 95%   BMI 47.03 kg/m²     General Appearance:    Alert, cooperative, no distress   Head:    Normocephalic, without obvious abnormality, atraumatic   Eyes:       Throat:   Lips, tongue, gums normal   Neck:   Supple, symmetrical, trachea midline, no JVD   Lungs:     Clear to auscultation bilaterally, respirations unlabored   Chest Wall:    No tenderness or deformity    Heart:    Regular rate and rhythm, S1 and S2 normal, no murmur,no  rub or gallop   Abdomen:     Soft, mild epigastric tenderness, bowel sounds active, no masses, no organomegaly    Extremities:   Extremities normal, atraumatic, no cyanosis or edema   Pulses:      Skin:   Skin is warm and dry,  no rashes or palpable lesions   Neurologic:   no focal deficits noted      Lab Results (last 72 hours)       Procedure Component Value Units Date/Time    Lipase [[id number]]  (Abnormal) Collected: 24 0700    Specimen: Blood Updated: 24 08     Lipase 1,846 U/L     " Comprehensive Metabolic Panel [[id number]]  (Abnormal) Collected: 12/02/24 0700    Specimen: Blood Updated: 12/02/24 0756     Glucose 120 mg/dL      BUN 9 mg/dL      Creatinine 0.85 mg/dL      Sodium 140 mmol/L      Potassium 3.3 mmol/L      Chloride 106 mmol/L      CO2 25.3 mmol/L      Calcium 8.5 mg/dL      Total Protein 6.1 g/dL      Albumin 3.3 g/dL      ALT (SGPT) 271 U/L      AST (SGOT) 357 U/L      Alkaline Phosphatase 190 U/L      Total Bilirubin 3.2 mg/dL      Globulin 2.8 gm/dL      A/G Ratio 1.2 g/dL      BUN/Creatinine Ratio 10.6     Anion Gap 8.7 mmol/L      eGFR 87.3 mL/min/1.73     Narrative:      GFR Normal >60  Chronic Kidney Disease <60  Kidney Failure <15      CBC Auto Differential [[id number]]  (Normal) Collected: 12/02/24 0700    Specimen: Blood Updated: 12/02/24 0742     WBC 9.25 10*3/mm3      RBC 4.53 10*6/mm3      Hemoglobin 13.7 g/dL      Hematocrit 39.7 %      MCV 87.6 fL      MCH 30.2 pg      MCHC 34.5 g/dL      RDW 12.4 %      RDW-SD 39.6 fl      MPV 9.2 fL      Platelets 323 10*3/mm3      Neutrophil % 65.0 %      Lymphocyte % 27.0 %      Monocyte % 6.1 %      Eosinophil % 1.2 %      Basophil % 0.5 %      Immature Grans % 0.2 %      Neutrophils, Absolute 6.01 10*3/mm3      Lymphocytes, Absolute 2.50 10*3/mm3      Monocytes, Absolute 0.56 10*3/mm3      Eosinophils, Absolute 0.11 10*3/mm3      Basophils, Absolute 0.05 10*3/mm3      Immature Grans, Absolute 0.02 10*3/mm3      nRBC 0.0 /100 WBC     CBC & Differential [[id number]]  (Abnormal) Collected: 12/01/24 1205    Specimen: Blood Updated: 12/01/24 1208    Narrative:      The following orders were created for panel order CBC & Differential.  Procedure                               Abnormality         Status                     ---------                               -----------         ------                     CBC Auto Differential[[id number]]        Abnormal            Final result                 Please view results for these tests on the  individual orders.    CBC Auto Differential [[id number]]  (Abnormal) Collected: 12/01/24 1205    Specimen: Blood Updated: 12/01/24 1208     WBC 10.01 10*3/mm3      RBC 4.94 10*6/mm3      Hemoglobin 14.9 g/dL      Hematocrit 45.3 %      MCV 91.7 fL      MCH 30.2 pg      MCHC 32.9 g/dL      RDW 12.3 %      RDW-SD 42.1 fl      MPV 10.0 fL      Platelets 340 10*3/mm3      Neutrophil % 79.7 %      Lymphocyte % 14.8 %      Monocyte % 4.9 %      Eosinophil % 0.1 %      Basophil % 0.2 %      Immature Grans % 0.3 %      Neutrophils, Absolute 7.98 10*3/mm3      Lymphocytes, Absolute 1.48 10*3/mm3      Monocytes, Absolute 0.49 10*3/mm3      Eosinophils, Absolute 0.01 10*3/mm3      Basophils, Absolute 0.02 10*3/mm3      Immature Grans, Absolute 0.03 10*3/mm3     Comprehensive Metabolic Panel [[id number]]  (Abnormal) Collected: 12/01/24 1035    Specimen: Blood Updated: 12/01/24 1055     Glucose 120 mg/dL      BUN 10 mg/dL      Creatinine 0.87 mg/dL      Sodium 137 mmol/L      Potassium 3.5 mmol/L      Chloride 102 mmol/L      CO2 25.7 mmol/L      Calcium 9.1 mg/dL      Total Protein 6.8 g/dL      Albumin 3.7 g/dL      ALT (SGPT) 181 U/L      AST (SGOT) 212 U/L      Alkaline Phosphatase 177 U/L      Total Bilirubin 1.5 mg/dL      Globulin 3.1 gm/dL      A/G Ratio 1.2 g/dL      BUN/Creatinine Ratio 11.5     Anion Gap 9.3 mmol/L      eGFR 84.9 mL/min/1.73     Narrative:      GFR Normal >60  Chronic Kidney Disease <60  Kidney Failure <15      Lipase [[id number]]  (Abnormal) Collected: 12/01/24 1035    Specimen: Blood Updated: 12/01/24 1055     Lipase 2,527 U/L           Data Review:  Results from last 7 days   Lab Units 12/02/24  0700 12/01/24  1035   SODIUM mmol/L 140 137   POTASSIUM mmol/L 3.3* 3.5   CHLORIDE mmol/L 106 102   CO2 mmol/L 25.3 25.7   BUN mg/dL 9 10   CREATININE mg/dL 0.85 0.87   GLUCOSE mg/dL 120* 120*   CALCIUM mg/dL 8.5* 9.1     Results from last 7 days   Lab Units 12/02/24  0700 12/01/24  1205   WBC 10*3/mm3 9.25  "10.01   HEMOGLOBIN g/dL 13.7 14.9   HEMATOCRIT % 39.7 45.3   PLATELETS 10*3/mm3 323 340             No results found for: \"TROPONINT\"      Results from last 7 days   Lab Units 12/02/24  0700 12/01/24  1035   ALK PHOS U/L 190* 177*   BILIRUBIN mg/dL 3.2* 1.5*   ALT (SGPT) U/L 271* 181*   AST (SGOT) U/L 357* 212*             No results found for: \"POCGLU\"        History reviewed. No pertinent past medical history.    Assessment:  Active Hospital Problems    Diagnosis  POA    **Pancreatitis [K85.90]  Yes    HTN (hypertension) [I10]  Unknown    Obesity, Class III, BMI 40-49.9 (morbid obesity) [E66.01]  Unknown      Resolved Hospital Problems   No resolved problems to display.       Plan:  Await MRI/MRCP of pancreas.  Continue with IV fluids with pain and nausea medicine.  Await recommendations from GI medicine.  May need ERCP.  Follow-up on labs.    Vitaly Lee MD  12/2/2024  13:52 EST   "

## 2024-12-03 LAB
ALBUMIN SERPL-MCNC: 2.7 G/DL (ref 3.5–5.2)
ALBUMIN/GLOB SERPL: 0.8 G/DL
ALP SERPL-CCNC: 214 U/L (ref 39–117)
ALT SERPL W P-5'-P-CCNC: 304 U/L (ref 1–33)
ANION GAP SERPL CALCULATED.3IONS-SCNC: 11 MMOL/L (ref 5–15)
AST SERPL-CCNC: 307 U/L (ref 1–32)
BASOPHILS # BLD AUTO: 0.06 10*3/MM3 (ref 0–0.2)
BASOPHILS NFR BLD AUTO: 0.7 % (ref 0–1.5)
BILIRUB CONJ SERPL-MCNC: 3.5 MG/DL (ref 0–0.3)
BILIRUB INDIRECT SERPL-MCNC: 0.6 MG/DL
BILIRUB SERPL-MCNC: 4.1 MG/DL (ref 0–1.2)
BILIRUB SERPL-MCNC: 4.2 MG/DL (ref 0–1.2)
BUN SERPL-MCNC: 9 MG/DL (ref 6–20)
BUN/CREAT SERPL: 10.7 (ref 7–25)
CALCIUM SPEC-SCNC: 8 MG/DL (ref 8.6–10.5)
CHLORIDE SERPL-SCNC: 109 MMOL/L (ref 98–107)
CO2 SERPL-SCNC: 22 MMOL/L (ref 22–29)
CREAT SERPL-MCNC: 0.84 MG/DL (ref 0.57–1)
DEPRECATED RDW RBC AUTO: 40.2 FL (ref 37–54)
EGFRCR SERPLBLD CKD-EPI 2021: 88.6 ML/MIN/1.73
EOSINOPHIL # BLD AUTO: 0.12 10*3/MM3 (ref 0–0.4)
EOSINOPHIL NFR BLD AUTO: 1.4 % (ref 0.3–6.2)
ERYTHROCYTE [DISTWIDTH] IN BLOOD BY AUTOMATED COUNT: 12.5 % (ref 12.3–15.4)
GLOBULIN UR ELPH-MCNC: 3.4 GM/DL
GLUCOSE SERPL-MCNC: 96 MG/DL (ref 65–99)
HCT VFR BLD AUTO: 37.5 % (ref 34–46.6)
HGB BLD-MCNC: 12.8 G/DL (ref 12–15.9)
IMM GRANULOCYTES # BLD AUTO: 0.05 10*3/MM3 (ref 0–0.05)
IMM GRANULOCYTES NFR BLD AUTO: 0.6 % (ref 0–0.5)
LIPASE SERPL-CCNC: 539 U/L (ref 13–60)
LYMPHOCYTES # BLD AUTO: 2.42 10*3/MM3 (ref 0.7–3.1)
LYMPHOCYTES NFR BLD AUTO: 28.2 % (ref 19.6–45.3)
MCH RBC QN AUTO: 30.1 PG (ref 26.6–33)
MCHC RBC AUTO-ENTMCNC: 34.1 G/DL (ref 31.5–35.7)
MCV RBC AUTO: 88.2 FL (ref 79–97)
MONOCYTES # BLD AUTO: 0.57 10*3/MM3 (ref 0.1–0.9)
MONOCYTES NFR BLD AUTO: 6.7 % (ref 5–12)
NEUTROPHILS NFR BLD AUTO: 5.35 10*3/MM3 (ref 1.7–7)
NEUTROPHILS NFR BLD AUTO: 62.4 % (ref 42.7–76)
NRBC BLD AUTO-RTO: 0 /100 WBC (ref 0–0.2)
PLATELET # BLD AUTO: 302 10*3/MM3 (ref 140–450)
PMV BLD AUTO: 9.4 FL (ref 6–12)
POTASSIUM SERPL-SCNC: 3.4 MMOL/L (ref 3.5–5.2)
PROT SERPL-MCNC: 6.1 G/DL (ref 6–8.5)
RBC # BLD AUTO: 4.25 10*6/MM3 (ref 3.77–5.28)
SODIUM SERPL-SCNC: 142 MMOL/L (ref 136–145)
WBC NRBC COR # BLD AUTO: 8.57 10*3/MM3 (ref 3.4–10.8)

## 2024-12-03 PROCEDURE — 83690 ASSAY OF LIPASE: CPT | Performed by: HOSPITALIST

## 2024-12-03 PROCEDURE — 25010000002 HYDROMORPHONE PER 4 MG: Performed by: HOSPITALIST

## 2024-12-03 PROCEDURE — 99232 SBSQ HOSP IP/OBS MODERATE 35: CPT

## 2024-12-03 PROCEDURE — 85025 COMPLETE CBC W/AUTO DIFF WBC: CPT | Performed by: HOSPITALIST

## 2024-12-03 PROCEDURE — 80053 COMPREHEN METABOLIC PANEL: CPT | Performed by: HOSPITALIST

## 2024-12-03 PROCEDURE — 25810000003 SODIUM CHLORIDE 0.9 % SOLUTION: Performed by: HOSPITALIST

## 2024-12-03 PROCEDURE — 82248 BILIRUBIN DIRECT: CPT | Performed by: HOSPITALIST

## 2024-12-03 PROCEDURE — 82247 BILIRUBIN TOTAL: CPT | Performed by: HOSPITALIST

## 2024-12-03 RX ADMIN — SODIUM CHLORIDE 100 ML/HR: 9 INJECTION, SOLUTION INTRAVENOUS at 03:16

## 2024-12-03 RX ADMIN — HYDROMORPHONE HYDROCHLORIDE 0.5 MG: 1 INJECTION, SOLUTION INTRAMUSCULAR; INTRAVENOUS; SUBCUTANEOUS at 20:54

## 2024-12-03 RX ADMIN — Medication 5 MG: at 03:15

## 2024-12-03 RX ADMIN — HYDROMORPHONE HYDROCHLORIDE 0.5 MG: 1 INJECTION, SOLUTION INTRAMUSCULAR; INTRAVENOUS; SUBCUTANEOUS at 14:02

## 2024-12-03 RX ADMIN — SODIUM CHLORIDE 100 ML/HR: 9 INJECTION, SOLUTION INTRAVENOUS at 12:48

## 2024-12-03 RX ADMIN — PANTOPRAZOLE SODIUM 40 MG: 40 TABLET, DELAYED RELEASE ORAL at 09:10

## 2024-12-03 RX ADMIN — SODIUM CHLORIDE 100 ML/HR: 9 INJECTION, SOLUTION INTRAVENOUS at 22:50

## 2024-12-03 RX ADMIN — ACETAMINOPHEN 650 MG: 325 TABLET ORAL at 12:47

## 2024-12-03 RX ADMIN — Medication 10 ML: at 20:54

## 2024-12-03 RX ADMIN — Medication 10 ML: at 09:12

## 2024-12-03 NOTE — PLAN OF CARE
Goal Outcome Evaluation:                 VSS on RA. AOx4. Remains NPO. MD WILEY with ice chips this afternoon. PRN Tylenol x1, PRN Dilaudid x1 for abdominal pain. Family at bedside. Up ad jessica.

## 2024-12-03 NOTE — PLAN OF CARE
Goal Outcome Evaluation:  Plan of Care Reviewed With: patient        Progress: improving  Outcome Evaluation: Patient is A&Ox4, VSS, afebrile. NPO. Melatonin given because patient had trouble falling asleep claiming her mind was racing. No PRN pain medications requested- offered throughout the night. No complaints of nausea or vomiting. Patient was disconnected from IV and showered. IVFs infusing without difficulty.  Family at bedside. POC ongoing.

## 2024-12-03 NOTE — PROGRESS NOTES
[facility]  Inpatient Progress Note    Reason for Follow Up:  pancreatitis /abd pain     Subjective     Interval History:   Patient reports she is feeling a little bit better today.  Abdominal pain is improving.  No nausea or vomiting.  She is currently not had anything to eat.    LFTs are worsening and bilirubin is now 4.2    MRCP completed yesterday with acute interstitial edematous pancreatitis centered at pancreatic head with no fluid collection.  Mild dilated biliary tree with no evidence of choledocholithiasis-dilation is favored secondary to reservoir effect postcholecystectomy.    Current Facility-Administered Medications:     acetaminophen (TYLENOL) tablet 650 mg, 650 mg, Oral, Q4H PRN **OR** acetaminophen (TYLENOL) 160 MG/5ML oral solution 650 mg, 650 mg, Oral, Q4H PRN **OR** acetaminophen (TYLENOL) suppository 650 mg, 650 mg, Rectal, Q4H PRN, Vitaly Lee MD    sennosides-docusate (PERICOLACE) 8.6-50 MG per tablet 2 tablet, 2 tablet, Oral, BID PRN **AND** polyethylene glycol (MIRALAX) packet 17 g, 17 g, Oral, Daily PRN **AND** bisacodyl (DULCOLAX) EC tablet 5 mg, 5 mg, Oral, Daily PRN **AND** bisacodyl (DULCOLAX) suppository 10 mg, 10 mg, Rectal, Daily PRN, Vitaly Lee MD    HYDROmorphone (DILAUDID) injection 0.5 mg, 0.5 mg, Intravenous, Q2H PRN, 0.5 mg at 12/02/24 1402 **AND** naloxone (NARCAN) injection 0.4 mg, 0.4 mg, Intravenous, Q5 Min PRN, Vitaly Lee MD    melatonin tablet 5 mg, 5 mg, Oral, Nightly PRN, Simran Worthington, APRN, 5 mg at 12/03/24 0315    ondansetron ODT (ZOFRAN-ODT) disintegrating tablet 4 mg, 4 mg, Oral, Q6H PRN **OR** ondansetron (ZOFRAN) injection 4 mg, 4 mg, Intravenous, Q6H PRN, Vitaly Lee MD, 4 mg at 12/02/24 1457    pantoprazole (PROTONIX) EC tablet 40 mg, 40 mg, Oral, Daily, Vitaly Lee MD, 40 mg at 12/02/24 0907    prochlorperazine (COMPAZINE) injection 10 mg, 10 mg, Intravenous, Q6H PRN, Vitaly Lee MD, 10 mg at 12/02/24 1818     sodium chloride 0.9 % flush 10 mL, 10 mL, Intravenous, Q12H, Vitaly Lee MD, 10 mL at 12/02/24 2010    sodium chloride 0.9 % flush 10 mL, 10 mL, Intravenous, PRN, Vitaly Lee MD    sodium chloride 0.9 % infusion 40 mL, 40 mL, Intravenous, PRN, Vitaly Lee MD    sodium chloride 0.9 % infusion, 100 mL/hr, Intravenous, Continuous, Vitaly Lee MD, Last Rate: 100 mL/hr at 12/03/24 0316, 100 mL/hr at 12/03/24 0316    Objective     Vital Signs  Temp:  [97 °F (36.1 °C)-98.3 °F (36.8 °C)] 98.3 °F (36.8 °C)  Heart Rate:  [70-94] 70  Resp:  [16-18] 18  BP: (110-139)/(70-81) 122/70  Body mass index is 47.03 kg/m².    Intake/Output Summary (Last 24 hours) at 12/3/2024 0833  Last data filed at 12/3/2024 0316  Gross per 24 hour   Intake 1131.67 ml   Output 100 ml   Net 1031.67 ml     No intake/output data recorded.     Physical Exam:   General: patient awake, alert and cooperative   Eyes: Normal lids and lashes, no scleral icterus   Pulm: regular and unlabored   Abdomen: soft, mild tenderness to palpation, nondistended; normal bowel sounds     Results Review:     I reviewed the patient's new clinical results.    Results from last 7 days   Lab Units 12/03/24 0613 12/02/24  0700 12/01/24  1205   WBC 10*3/mm3 8.57 9.25 10.01   HEMOGLOBIN g/dL 12.8 13.7 14.9   HEMATOCRIT % 37.5 39.7 45.3   PLATELETS 10*3/mm3 302 323 340     Results from last 7 days   Lab Units 12/03/24 0613 12/02/24  0700 12/01/24  1035   SODIUM mmol/L 142 140 137   POTASSIUM mmol/L 3.4* 3.3* 3.5   CHLORIDE mmol/L 109* 106 102   CO2 mmol/L 22.0 25.3 25.7   BUN mg/dL 9 9 10   CREATININE mg/dL 0.84 0.85 0.87   CALCIUM mg/dL 8.0* 8.5* 9.1   BILIRUBIN mg/dL 4.2* 3.2* 1.5*   ALK PHOS U/L 214* 190* 177*   ALT (SGPT) U/L 304* 271* 181*   AST (SGOT) U/L 307* 357* 212*   GLUCOSE mg/dL 96 120* 120*         Lab Results   Lab Value Date/Time    LIPASE 539 (H) 12/03/2024 0613    LIPASE 1,846 (H) 12/02/2024 0700    LIPASE 2,527 (H) 12/01/2024 1035        Radiology:  MRI abdomen w wo contrast mrcp   Final Result   1. Acute interstitial edematous pancreatitis centered at the pancreatic   head. No organized pancreatic or peripancreatic fluid collection.   2. Post cholecystectomy. Mildly dilated biliary tree. No evidence of   choledocholithiasis. Biliary duct dilation is favored secondary to   reservoir effect post cholecystectomy.           This report was finalized on 12/2/2024 4:57 PM by Dr. Prem Flores M.D on Workstation: [id number]          CT Abdomen Pelvis Without Contrast   Final Result   1.  No measurable loculated peripancreatic fluid collection is seen.   However, please note evaluation is suboptimal without intravenous   contrast. Mild to moderate intra and extra medical duct dilation status   post cholecystectomy. While findings may be post surgical, they remain   indeterminant given patient's elevated liver enzymes and lipase levels   and biliary obstruction cannot be excluded. Further evaluation with   abdominal MRI and MRCP should be considered if there is clinical concern   for biliary obstruction..   2.  If this patient is at increased risk for lung cancer, follow-up   chest CT in 12 months can be considered to ensure stability. If this   patient is not at increased risk for lung cancer, no further follow-up   is necessary per Fleischner criteria.   3.  Other findings as above               This report was finalized on 12/1/2024 11:48 AM by Dr. Deni Marie M.D on Workstation: [id number]              Assessment & Plan     Active Hospital Problems    Diagnosis     **Pancreatitis     HTN (hypertension)     Obesity, Class III, BMI 40-49.9 (morbid obesity)        Assessment:  Epigastric abdominal pain  Elevated lipase - findings consistent with acute pancreatitis   Elevated LFTs   CT with mild to moderate intra and extra hepatic ductal dilation s/p CCY   Mounjaro therapy     All problems are new to me today     Plan:  Her symptoms are  improving however her LFTs continue to worsen.  MRI reviewed and discussed with biliary team-there may be slight filling defect at the ampulla upon their review, but given improvement in symptoms and current episode of pancreatitis without signs of cholangitis will monitor LFTs- If they continue to worsen by tomorrow may need to consider ERCP at that time.  Regardless, she may benefit from ERCP/EUS at some point in the future  Continue supportive care.    Patient and plan of care discussed w/ attending, Dr. Costa Maciel   [facility]  [phone]

## 2024-12-03 NOTE — PROGRESS NOTES
"DAILY PROGRESS NOTE  [facility]    Patient Identification:  Name: Racquel Degroot  Age: 44 y.o.  Sex: female  :  1980  MRN: [id number]         Primary Care Physician: Pretty Dean MD    Subjective:  Interval History: She continues to have abdominal pain with nausea and vomiting.  She feels a little bit better today but is starting to get some jaundice.    Objective:    Scheduled Meds:pantoprazole, 40 mg, Oral, Daily  sodium chloride, 10 mL, Intravenous, Q12H      Continuous Infusions:sodium chloride, 100 mL/hr, Last Rate: 100 mL/hr (24 1248)        Vital signs in last 24 hours:  Temp:  [97.3 °F (36.3 °C)-98.3 °F (36.8 °C)] 97.3 °F (36.3 °C)  Heart Rate:  [66-94] 66  Resp:  [16-18] 18  BP: (121-136)/(70-79) 136/79    Intake/Output:    Intake/Output Summary (Last 24 hours) at 12/3/2024 1610  Last data filed at 12/3/2024 0915  Gross per 24 hour   Intake 1131.67 ml   Output 300 ml   Net 831.67 ml       Exam:  /79 (BP Location: Right arm, Patient Position: Lying)   Pulse 66   Temp 97.3 °F (36.3 °C) (Oral)   Resp 18   Ht 164.6 cm (64.8\")   Wt 127 kg (280 lb 13.9 oz)   LMP 10/24/2024   SpO2 97%   BMI 47.03 kg/m²     General Appearance:    Alert, cooperative, no distress   Head:    Normocephalic, without obvious abnormality, atraumatic   Eyes:       Throat:   Lips, tongue, gums normal   Neck:   Supple, symmetrical, trachea midline, no JVD   Lungs:     Clear to auscultation bilaterally, respirations unlabored   Chest Wall:    No tenderness or deformity    Heart:    Regular rate and rhythm, S1 and S2 normal, no murmur,no  rub or gallop   Abdomen:     Soft, mild epigastric tenderness, bowel sounds active, no masses, no organomegaly    Extremities:   Extremities normal, atraumatic, no cyanosis or edema   Pulses:      Skin:   Skin is warm and dry,  no rashes or palpable lesions   Neurologic:   no focal deficits noted      Lab Results (last 72 hours)       Procedure Component " Value Units Date/Time    Lipase [[id number]]  (Abnormal) Collected: 12/02/24 0700    Specimen: Blood Updated: 12/02/24 0805     Lipase 1,846 U/L     Comprehensive Metabolic Panel [[id number]]  (Abnormal) Collected: 12/02/24 0700    Specimen: Blood Updated: 12/02/24 0756     Glucose 120 mg/dL      BUN 9 mg/dL      Creatinine 0.85 mg/dL      Sodium 140 mmol/L      Potassium 3.3 mmol/L      Chloride 106 mmol/L      CO2 25.3 mmol/L      Calcium 8.5 mg/dL      Total Protein 6.1 g/dL      Albumin 3.3 g/dL      ALT (SGPT) 271 U/L      AST (SGOT) 357 U/L      Alkaline Phosphatase 190 U/L      Total Bilirubin 3.2 mg/dL      Globulin 2.8 gm/dL      A/G Ratio 1.2 g/dL      BUN/Creatinine Ratio 10.6     Anion Gap 8.7 mmol/L      eGFR 87.3 mL/min/1.73     Narrative:      GFR Normal >60  Chronic Kidney Disease <60  Kidney Failure <15      CBC Auto Differential [[id number]]  (Normal) Collected: 12/02/24 0700    Specimen: Blood Updated: 12/02/24 0742     WBC 9.25 10*3/mm3      RBC 4.53 10*6/mm3      Hemoglobin 13.7 g/dL      Hematocrit 39.7 %      MCV 87.6 fL      MCH 30.2 pg      MCHC 34.5 g/dL      RDW 12.4 %      RDW-SD 39.6 fl      MPV 9.2 fL      Platelets 323 10*3/mm3      Neutrophil % 65.0 %      Lymphocyte % 27.0 %      Monocyte % 6.1 %      Eosinophil % 1.2 %      Basophil % 0.5 %      Immature Grans % 0.2 %      Neutrophils, Absolute 6.01 10*3/mm3      Lymphocytes, Absolute 2.50 10*3/mm3      Monocytes, Absolute 0.56 10*3/mm3      Eosinophils, Absolute 0.11 10*3/mm3      Basophils, Absolute 0.05 10*3/mm3      Immature Grans, Absolute 0.02 10*3/mm3      nRBC 0.0 /100 WBC     CBC & Differential [[id number]]  (Abnormal) Collected: 12/01/24 1205    Specimen: Blood Updated: 12/01/24 1208    Narrative:      The following orders were created for panel order CBC & Differential.  Procedure                               Abnormality         Status                     ---------                               -----------         ------                      CBC Auto Differential[[id number]]        Abnormal            Final result                 Please view results for these tests on the individual orders.    CBC Auto Differential [[id number]]  (Abnormal) Collected: 12/01/24 1205    Specimen: Blood Updated: 12/01/24 1208     WBC 10.01 10*3/mm3      RBC 4.94 10*6/mm3      Hemoglobin 14.9 g/dL      Hematocrit 45.3 %      MCV 91.7 fL      MCH 30.2 pg      MCHC 32.9 g/dL      RDW 12.3 %      RDW-SD 42.1 fl      MPV 10.0 fL      Platelets 340 10*3/mm3      Neutrophil % 79.7 %      Lymphocyte % 14.8 %      Monocyte % 4.9 %      Eosinophil % 0.1 %      Basophil % 0.2 %      Immature Grans % 0.3 %      Neutrophils, Absolute 7.98 10*3/mm3      Lymphocytes, Absolute 1.48 10*3/mm3      Monocytes, Absolute 0.49 10*3/mm3      Eosinophils, Absolute 0.01 10*3/mm3      Basophils, Absolute 0.02 10*3/mm3      Immature Grans, Absolute 0.03 10*3/mm3     Comprehensive Metabolic Panel [[id number]]  (Abnormal) Collected: 12/01/24 1035    Specimen: Blood Updated: 12/01/24 1055     Glucose 120 mg/dL      BUN 10 mg/dL      Creatinine 0.87 mg/dL      Sodium 137 mmol/L      Potassium 3.5 mmol/L      Chloride 102 mmol/L      CO2 25.7 mmol/L      Calcium 9.1 mg/dL      Total Protein 6.8 g/dL      Albumin 3.7 g/dL      ALT (SGPT) 181 U/L      AST (SGOT) 212 U/L      Alkaline Phosphatase 177 U/L      Total Bilirubin 1.5 mg/dL      Globulin 3.1 gm/dL      A/G Ratio 1.2 g/dL      BUN/Creatinine Ratio 11.5     Anion Gap 9.3 mmol/L      eGFR 84.9 mL/min/1.73     Narrative:      GFR Normal >60  Chronic Kidney Disease <60  Kidney Failure <15      Lipase [[id number]]  (Abnormal) Collected: 12/01/24 1035    Specimen: Blood Updated: 12/01/24 1055     Lipase 2,527 U/L           Data Review:  Results from last 7 days   Lab Units 12/03/24  0613 12/02/24  0700 12/01/24  1035   SODIUM mmol/L 142 140 137   POTASSIUM mmol/L 3.4* 3.3* 3.5   CHLORIDE mmol/L 109* 106 102   CO2 mmol/L 22.0 25.3 25.7   BUN  "mg/dL 9 9 10   CREATININE mg/dL 0.84 0.85 0.87   GLUCOSE mg/dL 96 120* 120*   CALCIUM mg/dL 8.0* 8.5* 9.1     Results from last 7 days   Lab Units 12/03/24  0613 12/02/24  0700 12/01/24  1205   WBC 10*3/mm3 8.57 9.25 10.01   HEMOGLOBIN g/dL 12.8 13.7 14.9   HEMATOCRIT % 37.5 39.7 45.3   PLATELETS 10*3/mm3 302 323 340             No results found for: \"TROPONINT\"      Results from last 7 days   Lab Units 12/03/24  0613 12/02/24  0700 12/01/24  1035   ALK PHOS U/L 214* 190* 177*   BILIRUBIN mg/dL 4.2* 3.2* 1.5*   ALT (SGPT) U/L 304* 271* 181*   AST (SGOT) U/L 307* 357* 212*             No results found for: \"POCGLU\"        History reviewed. No pertinent past medical history.    Assessment:  Active Hospital Problems    Diagnosis  POA    **Pancreatitis [K85.90]  Yes    HTN (hypertension) [I10]  Unknown    Obesity, Class III, BMI 40-49.9 (morbid obesity) [E66.01]  Unknown      Resolved Hospital Problems   No resolved problems to display.       Plan:  Results of MRCP noted..  Continue with IV fluids with pain and nausea medicine.  Await recommendations from GI medicine.  May need ERCP. liver tests continue to elevate but lipase is lower.  Follow-up on labs.  Continue with bowel rest but will give her a few ice chips.    Vitaly Lee MD  12/3/2024  16:10 EST   "

## 2024-12-04 ENCOUNTER — ANESTHESIA EVENT (OUTPATIENT)
Dept: GASTROENTEROLOGY | Facility: HOSPITAL | Age: 44
End: 2024-12-04
Payer: COMMERCIAL

## 2024-12-04 ENCOUNTER — INPATIENT HOSPITAL (OUTPATIENT)
Age: 44
End: 2024-12-04
Payer: COMMERCIAL

## 2024-12-04 ENCOUNTER — ANESTHESIA (OUTPATIENT)
Dept: GASTROENTEROLOGY | Facility: HOSPITAL | Age: 44
End: 2024-12-04
Payer: COMMERCIAL

## 2024-12-04 ENCOUNTER — APPOINTMENT (OUTPATIENT)
Dept: GENERAL RADIOLOGY | Facility: HOSPITAL | Age: 44
DRG: 439 | End: 2024-12-04
Payer: COMMERCIAL

## 2024-12-04 ENCOUNTER — INPATIENT HOSPITAL (OUTPATIENT)
Dept: URBAN - METROPOLITAN AREA HOSPITAL 113 | Facility: HOSPITAL | Age: 44
End: 2024-12-04
Payer: COMMERCIAL

## 2024-12-04 DIAGNOSIS — K29.50 UNSPECIFIED CHRONIC GASTRITIS WITHOUT BLEEDING: ICD-10-CM

## 2024-12-04 DIAGNOSIS — R94.5 ABNORMAL RESULTS OF LIVER FUNCTION STUDIES: ICD-10-CM

## 2024-12-04 DIAGNOSIS — K80.50 CALCULUS OF BILE DUCT WITHOUT CHOLANGITIS OR CHOLECYSTITIS W: ICD-10-CM

## 2024-12-04 LAB
ALBUMIN SERPL-MCNC: 3.3 G/DL (ref 3.5–5.2)
ALBUMIN SERPL-MCNC: 3.5 G/DL (ref 3.5–5.2)
ALBUMIN/GLOB SERPL: 1.1 G/DL
ALP SERPL-CCNC: 247 U/L (ref 39–117)
ALP SERPL-CCNC: 249 U/L (ref 39–117)
ALT SERPL W P-5'-P-CCNC: 272 U/L (ref 1–33)
ALT SERPL W P-5'-P-CCNC: 290 U/L (ref 1–33)
ANION GAP SERPL CALCULATED.3IONS-SCNC: 14 MMOL/L (ref 5–15)
AST SERPL-CCNC: 178 U/L (ref 1–32)
AST SERPL-CCNC: 242 U/L (ref 1–32)
BASOPHILS # BLD AUTO: 0.06 10*3/MM3 (ref 0–0.2)
BASOPHILS NFR BLD AUTO: 0.7 % (ref 0–1.5)
BILIRUB CONJ SERPL-MCNC: 1.2 MG/DL (ref 0–0.3)
BILIRUB INDIRECT SERPL-MCNC: 0.9 MG/DL
BILIRUB SERPL-MCNC: 2.1 MG/DL (ref 0–1.2)
BILIRUB SERPL-MCNC: 5.8 MG/DL (ref 0–1.2)
BUN SERPL-MCNC: 8 MG/DL (ref 6–20)
BUN/CREAT SERPL: 10 (ref 7–25)
CALCIUM SPEC-SCNC: 8.4 MG/DL (ref 8.6–10.5)
CHLORIDE SERPL-SCNC: 107 MMOL/L (ref 98–107)
CO2 SERPL-SCNC: 21 MMOL/L (ref 22–29)
CREAT SERPL-MCNC: 0.8 MG/DL (ref 0.57–1)
DEPRECATED RDW RBC AUTO: 40.8 FL (ref 37–54)
EGFRCR SERPLBLD CKD-EPI 2021: 93.9 ML/MIN/1.73
EOSINOPHIL # BLD AUTO: 0.11 10*3/MM3 (ref 0–0.4)
EOSINOPHIL NFR BLD AUTO: 1.2 % (ref 0.3–6.2)
ERYTHROCYTE [DISTWIDTH] IN BLOOD BY AUTOMATED COUNT: 12.5 % (ref 12.3–15.4)
GLOBULIN UR ELPH-MCNC: 2.9 GM/DL
GLUCOSE SERPL-MCNC: 94 MG/DL (ref 65–99)
HCT VFR BLD AUTO: 40.1 % (ref 34–46.6)
HGB BLD-MCNC: 13.1 G/DL (ref 12–15.9)
IMM GRANULOCYTES # BLD AUTO: 0.07 10*3/MM3 (ref 0–0.05)
IMM GRANULOCYTES NFR BLD AUTO: 0.8 % (ref 0–0.5)
LIPASE SERPL-CCNC: 504 U/L (ref 13–60)
LYMPHOCYTES # BLD AUTO: 2.23 10*3/MM3 (ref 0.7–3.1)
LYMPHOCYTES NFR BLD AUTO: 24.3 % (ref 19.6–45.3)
MCH RBC QN AUTO: 29.2 PG (ref 26.6–33)
MCHC RBC AUTO-ENTMCNC: 32.7 G/DL (ref 31.5–35.7)
MCV RBC AUTO: 89.5 FL (ref 79–97)
MONOCYTES # BLD AUTO: 0.45 10*3/MM3 (ref 0.1–0.9)
MONOCYTES NFR BLD AUTO: 4.9 % (ref 5–12)
NEUTROPHILS NFR BLD AUTO: 6.26 10*3/MM3 (ref 1.7–7)
NEUTROPHILS NFR BLD AUTO: 68.1 % (ref 42.7–76)
NRBC BLD AUTO-RTO: 0 /100 WBC (ref 0–0.2)
PLATELET # BLD AUTO: 328 10*3/MM3 (ref 140–450)
PMV BLD AUTO: 9.3 FL (ref 6–12)
POTASSIUM SERPL-SCNC: 3.8 MMOL/L (ref 3.5–5.2)
PROT SERPL-MCNC: 6.2 G/DL (ref 6–8.5)
PROT SERPL-MCNC: 6.7 G/DL (ref 6–8.5)
RBC # BLD AUTO: 4.48 10*6/MM3 (ref 3.77–5.28)
SODIUM SERPL-SCNC: 142 MMOL/L (ref 136–145)
WBC NRBC COR # BLD AUTO: 9.18 10*3/MM3 (ref 3.4–10.8)

## 2024-12-04 PROCEDURE — 25810000003 SODIUM CHLORIDE 0.9 % SOLUTION: Performed by: INTERNAL MEDICINE

## 2024-12-04 PROCEDURE — 82248 BILIRUBIN DIRECT: CPT | Performed by: HOSPITALIST

## 2024-12-04 PROCEDURE — 43264 ERCP REMOVE DUCT CALCULI: CPT | Performed by: INTERNAL MEDICINE

## 2024-12-04 PROCEDURE — 0FC98ZZ EXTIRPATION OF MATTER FROM COMMON BILE DUCT, VIA NATURAL OR ARTIFICIAL OPENING ENDOSCOPIC: ICD-10-PCS | Performed by: INTERNAL MEDICINE

## 2024-12-04 PROCEDURE — 43262 ENDO CHOLANGIOPANCREATOGRAPH: CPT | Mod: 59 | Performed by: INTERNAL MEDICINE

## 2024-12-04 PROCEDURE — 25810000003 LACTATED RINGERS PER 1000 ML: Performed by: ANESTHESIOLOGY

## 2024-12-04 PROCEDURE — 74328 X-RAY BILE DUCT ENDOSCOPY: CPT

## 2024-12-04 PROCEDURE — 25010000002 PROPOFOL 10 MG/ML EMULSION: Performed by: ANESTHESIOLOGY

## 2024-12-04 PROCEDURE — 0F798ZZ DILATION OF COMMON BILE DUCT, VIA NATURAL OR ARTIFICIAL OPENING ENDOSCOPIC: ICD-10-PCS | Performed by: INTERNAL MEDICINE

## 2024-12-04 PROCEDURE — 83690 ASSAY OF LIPASE: CPT | Performed by: HOSPITALIST

## 2024-12-04 PROCEDURE — 25010000002 LIDOCAINE 2% SOLUTION: Performed by: ANESTHESIOLOGY

## 2024-12-04 PROCEDURE — 80053 COMPREHEN METABOLIC PANEL: CPT | Performed by: HOSPITALIST

## 2024-12-04 PROCEDURE — 25510000001 IOPAMIDOL 61 % SOLUTION: Performed by: INTERNAL MEDICINE

## 2024-12-04 PROCEDURE — 99232 SBSQ HOSP IP/OBS MODERATE 35: CPT | Performed by: PHYSICIAN ASSISTANT

## 2024-12-04 PROCEDURE — 85025 COMPLETE CBC W/AUTO DIFF WBC: CPT | Performed by: HOSPITALIST

## 2024-12-04 PROCEDURE — 25010000002 SUCCINYLCHOLINE PER 20 MG: Performed by: ANESTHESIOLOGY

## 2024-12-04 PROCEDURE — C1769 GUIDE WIRE: HCPCS | Performed by: INTERNAL MEDICINE

## 2024-12-04 PROCEDURE — 25810000003 SODIUM CHLORIDE 0.9 % SOLUTION: Performed by: HOSPITALIST

## 2024-12-04 PROCEDURE — 25010000002 HYDROMORPHONE PER 4 MG: Performed by: HOSPITALIST

## 2024-12-04 RX ORDER — LIDOCAINE HYDROCHLORIDE 10 MG/ML
0.5 INJECTION, SOLUTION INFILTRATION; PERINEURAL ONCE AS NEEDED
Status: DISCONTINUED | OUTPATIENT
Start: 2024-12-04 | End: 2024-12-04 | Stop reason: HOSPADM

## 2024-12-04 RX ORDER — ROCURONIUM BROMIDE 10 MG/ML
INJECTION, SOLUTION INTRAVENOUS AS NEEDED
Status: DISCONTINUED | OUTPATIENT
Start: 2024-12-04 | End: 2024-12-04 | Stop reason: SURG

## 2024-12-04 RX ORDER — PROPOFOL 10 MG/ML
VIAL (ML) INTRAVENOUS AS NEEDED
Status: DISCONTINUED | OUTPATIENT
Start: 2024-12-04 | End: 2024-12-04 | Stop reason: SURG

## 2024-12-04 RX ORDER — INDOMETHACIN 100 MG
SUPPOSITORY, RECTAL RECTAL AS NEEDED
Status: DISCONTINUED | OUTPATIENT
Start: 2024-12-04 | End: 2024-12-04 | Stop reason: HOSPADM

## 2024-12-04 RX ORDER — SUCCINYLCHOLINE CHLORIDE 20 MG/ML
INJECTION INTRAMUSCULAR; INTRAVENOUS AS NEEDED
Status: DISCONTINUED | OUTPATIENT
Start: 2024-12-04 | End: 2024-12-04 | Stop reason: SURG

## 2024-12-04 RX ORDER — SODIUM CHLORIDE 0.9 % (FLUSH) 0.9 %
10 SYRINGE (ML) INJECTION AS NEEDED
Status: DISCONTINUED | OUTPATIENT
Start: 2024-12-04 | End: 2024-12-04 | Stop reason: HOSPADM

## 2024-12-04 RX ORDER — SODIUM CHLORIDE 9 MG/ML
1000 INJECTION, SOLUTION INTRAVENOUS CONTINUOUS
Status: ACTIVE | OUTPATIENT
Start: 2024-12-04 | End: 2024-12-04

## 2024-12-04 RX ORDER — LIDOCAINE HYDROCHLORIDE 20 MG/ML
INJECTION, SOLUTION INFILTRATION; PERINEURAL AS NEEDED
Status: DISCONTINUED | OUTPATIENT
Start: 2024-12-04 | End: 2024-12-04 | Stop reason: SURG

## 2024-12-04 RX ORDER — LOSARTAN POTASSIUM 100 MG/1
100 TABLET ORAL DAILY
Status: DISCONTINUED | OUTPATIENT
Start: 2024-12-04 | End: 2024-12-06 | Stop reason: HOSPADM

## 2024-12-04 RX ORDER — IOPAMIDOL 612 MG/ML
INJECTION, SOLUTION INTRAVASCULAR AS NEEDED
Status: DISCONTINUED | OUTPATIENT
Start: 2024-12-04 | End: 2024-12-04 | Stop reason: HOSPADM

## 2024-12-04 RX ORDER — PANTOPRAZOLE SODIUM 40 MG/10ML
40 INJECTION, POWDER, LYOPHILIZED, FOR SOLUTION INTRAVENOUS
Status: DISCONTINUED | OUTPATIENT
Start: 2024-12-05 | End: 2024-12-06 | Stop reason: HOSPADM

## 2024-12-04 RX ORDER — SODIUM CHLORIDE, SODIUM LACTATE, POTASSIUM CHLORIDE, CALCIUM CHLORIDE 600; 310; 30; 20 MG/100ML; MG/100ML; MG/100ML; MG/100ML
INJECTION, SOLUTION INTRAVENOUS CONTINUOUS PRN
Status: DISCONTINUED | OUTPATIENT
Start: 2024-12-04 | End: 2024-12-04 | Stop reason: SURG

## 2024-12-04 RX ADMIN — Medication 10 ML: at 20:33

## 2024-12-04 RX ADMIN — ROCURONIUM BROMIDE 5 MG: 10 INJECTION, SOLUTION INTRAVENOUS at 17:38

## 2024-12-04 RX ADMIN — SUCCINYLCHOLINE CHLORIDE 200 MG: 20 INJECTION, SOLUTION INTRAMUSCULAR; INTRAVENOUS; PARENTERAL at 17:40

## 2024-12-04 RX ADMIN — PROPOFOL 250 MG: 10 INJECTION, EMULSION INTRAVENOUS at 17:39

## 2024-12-04 RX ADMIN — PANTOPRAZOLE SODIUM 40 MG: 40 TABLET, DELAYED RELEASE ORAL at 08:42

## 2024-12-04 RX ADMIN — PROPOFOL 100 MG: 10 INJECTION, EMULSION INTRAVENOUS at 17:48

## 2024-12-04 RX ADMIN — HYDROMORPHONE HYDROCHLORIDE 0.5 MG: 1 INJECTION, SOLUTION INTRAMUSCULAR; INTRAVENOUS; SUBCUTANEOUS at 03:00

## 2024-12-04 RX ADMIN — LIDOCAINE HYDROCHLORIDE 100 MG: 20 INJECTION, SOLUTION INFILTRATION; PERINEURAL at 17:39

## 2024-12-04 RX ADMIN — SODIUM CHLORIDE, POTASSIUM CHLORIDE, SODIUM LACTATE AND CALCIUM CHLORIDE: 600; 310; 30; 20 INJECTION, SOLUTION INTRAVENOUS at 17:36

## 2024-12-04 RX ADMIN — SODIUM CHLORIDE 100 ML/HR: 9 INJECTION, SOLUTION INTRAVENOUS at 21:37

## 2024-12-04 RX ADMIN — SODIUM CHLORIDE 1000 ML: 9 INJECTION, SOLUTION INTRAVENOUS at 16:36

## 2024-12-04 RX ADMIN — LOSARTAN POTASSIUM 100 MG: 100 TABLET, FILM COATED ORAL at 20:32

## 2024-12-04 RX ADMIN — SODIUM CHLORIDE 100 ML/HR: 9 INJECTION, SOLUTION INTRAVENOUS at 08:32

## 2024-12-04 RX ADMIN — Medication 5 MG: at 00:01

## 2024-12-04 NOTE — ANESTHESIA PROCEDURE NOTES
Airway  Urgency: elective    Date/Time: 12/4/2024 5:41 PM  End Time:12/4/2024 5:41 PM  Airway not difficult    General Information and Staff    Patient location during procedure: OR  Anesthesiologist: Blake Yang MD    Indications and Patient Condition  Indications for airway management: airway protection    Preoxygenated: yes  Mask difficulty assessment: 0 - not attempted    Final Airway Details  Final airway type: endotracheal airway      Successful airway: ETT  Cuffed: yes   Successful intubation technique: direct laryngoscopy  Facilitating devices/methods: Bougie  Endotracheal tube insertion site: oral  Blade: Pete  Blade size: 2  ETT size (mm): 7.0  Cormack-Lehane Classification: grade I - full view of glottis  Placement verified by: chest auscultation and capnometry   Number of attempts at approach: 1  Assessment: lips, teeth, and gum same as pre-op and atraumatic intubation

## 2024-12-04 NOTE — PLAN OF CARE
Goal Outcome Evaluation:  Plan of Care Reviewed With: patient        Progress: improving  Outcome Evaluation: vss, iv dilaudid and zofran for pain and nausea, keep npo with ice chips, voiding freely, sclera yellow, up ad jessica, continue to monitor the pt.

## 2024-12-04 NOTE — PROGRESS NOTES
[facility]  Inpatient Progress Note    Reason for Followup:  pancreatitis     Subjective     Interval History:   Patient currently denies any abdominal pain, nausea, vomiting.  She did have 1 small episode last night.  She is hungry. ALP and T. bili continue to increase.     Current Facility-Administered Medications:     acetaminophen (TYLENOL) tablet 650 mg, 650 mg, Oral, Q4H PRN, 650 mg at 12/03/24 1247 **OR** acetaminophen (TYLENOL) 160 MG/5ML oral solution 650 mg, 650 mg, Oral, Q4H PRN **OR** acetaminophen (TYLENOL) suppository 650 mg, 650 mg, Rectal, Q4H PRN, Vitaly Lee MD    sennosides-docusate (PERICOLACE) 8.6-50 MG per tablet 2 tablet, 2 tablet, Oral, BID PRN **AND** polyethylene glycol (MIRALAX) packet 17 g, 17 g, Oral, Daily PRN **AND** bisacodyl (DULCOLAX) EC tablet 5 mg, 5 mg, Oral, Daily PRN **AND** bisacodyl (DULCOLAX) suppository 10 mg, 10 mg, Rectal, Daily PRN, Vitaly Lee MD    HYDROmorphone (DILAUDID) injection 0.5 mg, 0.5 mg, Intravenous, Q2H PRN, 0.5 mg at 12/04/24 0300 **AND** naloxone (NARCAN) injection 0.4 mg, 0.4 mg, Intravenous, Q5 Min PRN, Vitaly Lee MD    melatonin tablet 5 mg, 5 mg, Oral, Nightly PRN, Simran Worthington, APRN, 5 mg at 12/04/24 0001    ondansetron ODT (ZOFRAN-ODT) disintegrating tablet 4 mg, 4 mg, Oral, Q6H PRN **OR** ondansetron (ZOFRAN) injection 4 mg, 4 mg, Intravenous, Q6H PRN, Vitaly Lee MD, 4 mg at 12/02/24 1457    pantoprazole (PROTONIX) EC tablet 40 mg, 40 mg, Oral, Daily, Vitaly Lee MD, 40 mg at 12/04/24 0842    prochlorperazine (COMPAZINE) injection 10 mg, 10 mg, Intravenous, Q6H PRN, Vitaly Lee MD, 10 mg at 12/02/24 1818    sodium chloride 0.9 % flush 10 mL, 10 mL, Intravenous, Q12H, Vitaly Lee MD, 10 mL at 12/03/24 2054    sodium chloride 0.9 % flush 10 mL, 10 mL, Intravenous, PRN, Vitaly Lee MD    sodium chloride 0.9 % infusion 40 mL, 40 mL, Intravenous, PRN, Vitaly Lee MD    sodium chloride 0.9 %  infusion, 100 mL/hr, Intravenous, Continuous, Vitaly Lee MD, Last Rate: 100 mL/hr at 12/04/24 0832, 100 mL/hr at 12/04/24 0832    Objective     Vital Signs  Temp:  [96.8 °F (36 °C)-98.3 °F (36.8 °C)] 97.1 °F (36.2 °C)  Heart Rate:  [66-82] 70  Resp:  [16-18] 16  BP: (127-151)/(73-83) 138/83  Body mass index is 47.03 kg/m².    Intake/Output Summary (Last 24 hours) at 12/4/2024 0948  Last data filed at 12/4/2024 0925  Gross per 24 hour   Intake 2060 ml   Output 1000 ml   Net 1060 ml     I/O this shift:  In: 1000 [I.V.:1000]  Out: 200 [Urine:200]     Physical Exam:   General: patient awake, alert and cooperative, scleral icterus    Abdomen: soft, nontender, nondistended; normal bowel sounds     Results Review:     I reviewed the patient's new clinical results.    Results from last 7 days   Lab Units 12/04/24  0542 12/03/24  0613 12/02/24  0700   WBC 10*3/mm3 9.18 8.57 9.25   HEMOGLOBIN g/dL 13.1 12.8 13.7   HEMATOCRIT % 40.1 37.5 39.7   PLATELETS 10*3/mm3 328 302 323     Results from last 7 days   Lab Units 12/04/24  0542 12/03/24  0613 12/02/24  0700   SODIUM mmol/L 142 142 140   POTASSIUM mmol/L 3.8 3.4* 3.3*   CHLORIDE mmol/L 107 109* 106   CO2 mmol/L 21.0* 22.0 25.3   BUN mg/dL 8 9 9   CREATININE mg/dL 0.80 0.84 0.85   CALCIUM mg/dL 8.4* 8.0* 8.5*   BILIRUBIN mg/dL 5.8* 4.1*  4.2* 3.2*   ALK PHOS U/L 247* 214* 190*   ALT (SGPT) U/L 290* 304* 271*   AST (SGOT) U/L 242* 307* 357*   GLUCOSE mg/dL 94 96 120*         Lab Results   Lab Value Date/Time    LIPASE 504 (H) 12/04/2024 0542    LIPASE 539 (H) 12/03/2024 0613    LIPASE 1,846 (H) 12/02/2024 0700    LIPASE 2,527 (H) 12/01/2024 1035       Radiology:  MRI abdomen w wo contrast mrcp   Final Result   1. Acute interstitial edematous pancreatitis centered at the pancreatic   head. No organized pancreatic or peripancreatic fluid collection.   2. Post cholecystectomy. Mildly dilated biliary tree. No evidence of   choledocholithiasis. Biliary duct dilation is favored  secondary to   reservoir effect post cholecystectomy.           This report was finalized on 12/2/2024 4:57 PM by Dr. Prem Flores M.D on Workstation: [id number]          CT Abdomen Pelvis Without Contrast   Final Result   1.  No measurable loculated peripancreatic fluid collection is seen.   However, please note evaluation is suboptimal without intravenous   contrast. Mild to moderate intra and extra medical duct dilation status   post cholecystectomy. While findings may be post surgical, they remain   indeterminant given patient's elevated liver enzymes and lipase levels   and biliary obstruction cannot be excluded. Further evaluation with   abdominal MRI and MRCP should be considered if there is clinical concern   for biliary obstruction..   2.  If this patient is at increased risk for lung cancer, follow-up   chest CT in 12 months can be considered to ensure stability. If this   patient is not at increased risk for lung cancer, no further follow-up   is necessary per Fleischner criteria.   3.  Other findings as above               This report was finalized on 12/1/2024 11:48 AM by Dr. Deni Marie M.D on Workstation: [id number]                Assessment & Plan   Assessment:   Epigastric abdominal pain  Elevated lipase - findings consistent with acute pancreatitis   Elevated LFTs   CT with mild to moderate intra and extra hepatic ductal dilation s/p CCY   Mounjaro therapy     Plan:   Her symptoms are improving however her LFTs continue to worsen.  MRI reviewed and discussed with biliary team-there may be slight filling defect at the ampulla upon their review, but given improvement in symptoms and current episode of pancreatitis without signs of cholangitis  Discussed ongoing changes in LFTs with Dr. Pedroza and planning for ERCP this afternoon - please contact biliary with any questions in regard to ERCP.   Keep NPO   Continue supportive care.    I discussed the patient's findings and my recommendations  with patient.    Dictated utilizing Dragon dictation.        Latonia Joaquin PA-C   [facility]  [address] [place] [address]  Office: [phone]

## 2024-12-04 NOTE — ANESTHESIA PREPROCEDURE EVALUATION
Anesthesia Evaluation     Patient summary reviewed and Nursing notes reviewed   NPO Solid Status: > 8 hours  NPO Liquid Status: > 4 hours           Airway   Mallampati: II  Neck ROM: full  Possible difficult intubation  Dental - normal exam     Pulmonary     breath sounds clear to auscultation  (+) a smoker Former,  Cardiovascular     Rhythm: regular    (+) hypertension      Neuro/Psych  GI/Hepatic/Renal/Endo    (+) obesity, morbid obesity    Musculoskeletal     Abdominal   (+) obese   Substance History      OB/GYN          Other                    Anesthesia Plan    ASA 3     general     intravenous induction     Anesthetic plan, risks, benefits, and alternatives have been provided, discussed and informed consent has been obtained with: patient.    CODE STATUS:    Level Of Support Discussed With: Patient  Code Status (Patient has no pulse and is not breathing): CPR (Attempt to Resuscitate)  Medical Interventions (Patient has pulse or is breathing): Full Support

## 2024-12-04 NOTE — PROGRESS NOTES
"DAILY PROGRESS NOTE  [facility]    Patient Identification:  Name: Racquel Degroot  Age: 44 y.o.  Sex: female  :  1980  MRN: [id number]         Primary Care Physician: Pretty Dean MD    Subjective:  Interval History: She continues to have abdominal pain with nausea and vomiting.  She feels a little bit better today but is starting to get some jaundice.    Objective:    Scheduled Meds:pantoprazole, 40 mg, Oral, Daily  sodium chloride, 10 mL, Intravenous, Q12H      Continuous Infusions:sodium chloride, 100 mL/hr, Last Rate: 100 mL/hr (24 0832)        Vital signs in last 24 hours:  Temp:  [96.8 °F (36 °C)-98.3 °F (36.8 °C)] 97.1 °F (36.2 °C)  Heart Rate:  [69-82] 70  Resp:  [16-18] 16  BP: (127-151)/(73-83) 138/83    Intake/Output:    Intake/Output Summary (Last 24 hours) at 2024 1336  Last data filed at 2024 0925  Gross per 24 hour   Intake 2060 ml   Output 1000 ml   Net 1060 ml       Exam:  /83 (BP Location: Right arm, Patient Position: Lying)   Pulse 70   Temp 97.1 °F (36.2 °C) (Oral)   Resp 16   Ht 164.6 cm (64.8\")   Wt 127 kg (280 lb 13.9 oz)   LMP 10/24/2024   SpO2 95%   BMI 47.03 kg/m²     General Appearance:    Alert, cooperative, no distress   Head:    Normocephalic, without obvious abnormality, atraumatic   Eyes:       Throat:   Lips, tongue, gums normal   Neck:   Supple, symmetrical, trachea midline, no JVD   Lungs:     Clear to auscultation bilaterally, respirations unlabored   Chest Wall:    No tenderness or deformity    Heart:    Regular rate and rhythm, S1 and S2 normal, no murmur,no  rub or gallop   Abdomen:     Soft, mild epigastric tenderness, bowel sounds active, no masses, no organomegaly    Extremities:   Extremities normal, atraumatic, no cyanosis or edema   Pulses:      Skin:   Skin is warm and dry,  no rashes or palpable lesions   Neurologic:   no focal deficits noted      Lab Results (last 72 hours)       Procedure Component Value " Units Date/Time    Lipase [[id number]]  (Abnormal) Collected: 12/02/24 0700    Specimen: Blood Updated: 12/02/24 0805     Lipase 1,846 U/L     Comprehensive Metabolic Panel [[id number]]  (Abnormal) Collected: 12/02/24 0700    Specimen: Blood Updated: 12/02/24 0756     Glucose 120 mg/dL      BUN 9 mg/dL      Creatinine 0.85 mg/dL      Sodium 140 mmol/L      Potassium 3.3 mmol/L      Chloride 106 mmol/L      CO2 25.3 mmol/L      Calcium 8.5 mg/dL      Total Protein 6.1 g/dL      Albumin 3.3 g/dL      ALT (SGPT) 271 U/L      AST (SGOT) 357 U/L      Alkaline Phosphatase 190 U/L      Total Bilirubin 3.2 mg/dL      Globulin 2.8 gm/dL      A/G Ratio 1.2 g/dL      BUN/Creatinine Ratio 10.6     Anion Gap 8.7 mmol/L      eGFR 87.3 mL/min/1.73     Narrative:      GFR Normal >60  Chronic Kidney Disease <60  Kidney Failure <15      CBC Auto Differential [[id number]]  (Normal) Collected: 12/02/24 0700    Specimen: Blood Updated: 12/02/24 0742     WBC 9.25 10*3/mm3      RBC 4.53 10*6/mm3      Hemoglobin 13.7 g/dL      Hematocrit 39.7 %      MCV 87.6 fL      MCH 30.2 pg      MCHC 34.5 g/dL      RDW 12.4 %      RDW-SD 39.6 fl      MPV 9.2 fL      Platelets 323 10*3/mm3      Neutrophil % 65.0 %      Lymphocyte % 27.0 %      Monocyte % 6.1 %      Eosinophil % 1.2 %      Basophil % 0.5 %      Immature Grans % 0.2 %      Neutrophils, Absolute 6.01 10*3/mm3      Lymphocytes, Absolute 2.50 10*3/mm3      Monocytes, Absolute 0.56 10*3/mm3      Eosinophils, Absolute 0.11 10*3/mm3      Basophils, Absolute 0.05 10*3/mm3      Immature Grans, Absolute 0.02 10*3/mm3      nRBC 0.0 /100 WBC     CBC & Differential [[id number]]  (Abnormal) Collected: 12/01/24 1205    Specimen: Blood Updated: 12/01/24 1208    Narrative:      The following orders were created for panel order CBC & Differential.  Procedure                               Abnormality         Status                     ---------                               -----------         ------                      CBC Auto Differential[[id number]]        Abnormal            Final result                 Please view results for these tests on the individual orders.    CBC Auto Differential [[id number]]  (Abnormal) Collected: 12/01/24 1205    Specimen: Blood Updated: 12/01/24 1208     WBC 10.01 10*3/mm3      RBC 4.94 10*6/mm3      Hemoglobin 14.9 g/dL      Hematocrit 45.3 %      MCV 91.7 fL      MCH 30.2 pg      MCHC 32.9 g/dL      RDW 12.3 %      RDW-SD 42.1 fl      MPV 10.0 fL      Platelets 340 10*3/mm3      Neutrophil % 79.7 %      Lymphocyte % 14.8 %      Monocyte % 4.9 %      Eosinophil % 0.1 %      Basophil % 0.2 %      Immature Grans % 0.3 %      Neutrophils, Absolute 7.98 10*3/mm3      Lymphocytes, Absolute 1.48 10*3/mm3      Monocytes, Absolute 0.49 10*3/mm3      Eosinophils, Absolute 0.01 10*3/mm3      Basophils, Absolute 0.02 10*3/mm3      Immature Grans, Absolute 0.03 10*3/mm3     Comprehensive Metabolic Panel [[id number]]  (Abnormal) Collected: 12/01/24 1035    Specimen: Blood Updated: 12/01/24 1055     Glucose 120 mg/dL      BUN 10 mg/dL      Creatinine 0.87 mg/dL      Sodium 137 mmol/L      Potassium 3.5 mmol/L      Chloride 102 mmol/L      CO2 25.7 mmol/L      Calcium 9.1 mg/dL      Total Protein 6.8 g/dL      Albumin 3.7 g/dL      ALT (SGPT) 181 U/L      AST (SGOT) 212 U/L      Alkaline Phosphatase 177 U/L      Total Bilirubin 1.5 mg/dL      Globulin 3.1 gm/dL      A/G Ratio 1.2 g/dL      BUN/Creatinine Ratio 11.5     Anion Gap 9.3 mmol/L      eGFR 84.9 mL/min/1.73     Narrative:      GFR Normal >60  Chronic Kidney Disease <60  Kidney Failure <15      Lipase [[id number]]  (Abnormal) Collected: 12/01/24 1035    Specimen: Blood Updated: 12/01/24 1055     Lipase 2,527 U/L           Data Review:  Results from last 7 days   Lab Units 12/04/24  0542 12/03/24  0613 12/02/24  0700   SODIUM mmol/L 142 142 140   POTASSIUM mmol/L 3.8 3.4* 3.3*   CHLORIDE mmol/L 107 109* 106   CO2 mmol/L 21.0* 22.0 25.3   BUN mg/dL 8  "9 9   CREATININE mg/dL 0.80 0.84 0.85   GLUCOSE mg/dL 94 96 120*   CALCIUM mg/dL 8.4* 8.0* 8.5*     Results from last 7 days   Lab Units 12/04/24  0542 12/03/24  0613 12/02/24  0700   WBC 10*3/mm3 9.18 8.57 9.25   HEMOGLOBIN g/dL 13.1 12.8 13.7   HEMATOCRIT % 40.1 37.5 39.7   PLATELETS 10*3/mm3 328 302 323             No results found for: \"TROPONINT\"      Results from last 7 days   Lab Units 12/04/24  0542 12/03/24  0613 12/02/24  0700   ALK PHOS U/L 247* 214* 190*   BILIRUBIN mg/dL 5.8* 4.1*  4.2* 3.2*   BILIRUBIN DIRECT mg/dL  --  3.5*  --    ALT (SGPT) U/L 290* 304* 271*   AST (SGOT) U/L 242* 307* 357*             No results found for: \"POCGLU\"        History reviewed. No pertinent past medical history.    Assessment:  Active Hospital Problems    Diagnosis  POA    **Pancreatitis [K85.90]  Yes    HTN (hypertension) [I10]  Unknown    Obesity, Class III, BMI 40-49.9 (morbid obesity) [E66.01]  Unknown      Resolved Hospital Problems   No resolved problems to display.       Plan:  Results of MRCP noted..  Continue with IV fluids with pain and nausea medicine.  Await recommendations from GI medicine.  May need ERCP. liver tests continue to elevate but lipase is lower.  Follow-up on labs.  Continue with bowel rest but will give her a few ice chips.    Vitaly Lee MD  12/4/2024  13:36 EST   "

## 2024-12-04 NOTE — OP NOTE
ENDOSCOPIC RETROGRADE CHOLANGIOPANCREATOGRAPHY Procedure Report    Patient Name:  Racquel Degroot  YOB: 1980    Date of Surgery:  12/4/2024     Pre-Op Diagnosis:  44 years old female with acute biliary pancreatitis with a progressive worsening of liver function test.        Procedure/CPT® Codes:      Procedure(s):  ENDOSCOPIC RETROGRADE CHOLANGIOPANCREATOGRAPHY with sphincterotomy and balloon sweep with stone removal    Staff:  Surgeon(s):  Kristi Pedroza MD      Anesthesia: Monitored Anesthesia Care    Description of Procedure:  A description of the procedure as well as risks, benefits and alternative methods were explained to the patient who voiced understanding and signed the corresponding consent form.Specifically risks of post-ERCP pancreatitis, bleeding, perforation, failure to canulate and adverse reaction to sedation were discussed. A physical exam was performed and vital signs were monitored throughout the procedure.    A  film was performed which was normal. With the patient in the semi-prone position, an Olympus side viewing endoscope was placed into the mouth and proceeded through the esophagus, stomach and second portion of the duodenum without difficulty. Limited views of the esophagus and stomach were normal. The ampulla was visualized and appeared normal. A Capsule Tech hydrotome was used to cannulate the ampulla using wire guided technique. Bile was aspirated to ensure this was the duct of interest. Contrast was injected into the bile duct.      The scope was then retroflexed and the fundus was visualized. The procedure was not difficult and there were no immediate complications.    Findings:   Erosive gastritis was noticed body antrum area.  Esophageal duodenal mucosa grossly looks normal.  Major ampulla was aligned with the scope it looks normal.  Selective cannulation of the common bile duct was obtained using dream tome.  Wire was advanced intrahepatically followed  by the catheter.  Opacification of the common bile duct was obtained with the Omni pack.  Very small filling defect noticed in the very distal area.  Sphincterotomy was performed 11 to 12 o'clock position using standard erbe setting.  Subsequent that, 9 to 12 mm balloon was introduced over the wire with multiple sweeps small stone was retrieved.  No other stone or sludge was retrieved.  Clinically gram did not show any further filling defect.  Patient Toller procedure very well immediate complication was noticed.    Pancreas duct was not cannulated, accessed during this procedure.  Patient did get indomethacin as well as Ringer lactate.    Impression:  1.  Small CBD stone was removed after sphincterotomy with subsequent clearance of common bile duct using 9 to 12 mm for balloon.  2.  Erosive gastritis.  3.  Normal duodenal esophageal mucosa.    Recommendations:  Follow-up with the liver function test.  Follow-up with the pancreatic enzymes.  Clear liquids today.  Resume other preop medication.  Patient will be placed on a PPI.    Kristi Pedroza MD     Date: 12/4/2024    Time: 18:05 EST

## 2024-12-04 NOTE — PAYOR COMM NOTE
"Emiliano Mooney (44 y.o. Female)    PLEASE SEE ATTACHED FOR RECONSIDERATION  REF#[id number]   PLEASE CALL MELIZA NICKERSON RN/ DEPT [phone] FAX  DEPT [phone]  THANK YOU   MELIZA NICKERSON RN  [facility]     PT STILL NPO  CONT IVF  MULTIPLE DOSES IV ABX AND IV ANTIEMETICS      Date of Birth   1980    Social Security Number       Address   [address] [address]    Home Phone   [phone]    MRN   [id number]       Congregational   None    Marital Status                               Admission Date   12/1/24    Admission Type   Urgent    Admitting Provider   Vitaly Lee MD    Attending Provider   Vitaly Lee MD    Department, Room/Bed   [address], 97/1       Discharge Date       Discharge Disposition       Discharge Destination                                 Attending Provider: Vitaly Lee MD    Allergies: Penicillins    Isolation: None   Infection: None   Code Status: CPR    Ht: 164.6 cm (64.8\")   Wt: 127 kg (280 lb 13.9 oz)    Admission Cmt: None   Principal Problem: Pancreatitis [K85.90]                   Active Insurance as of 12/1/2024       Primary Coverage       Payor Plan Insurance Group Employer/Plan Group    [facility] [id number]       Payor Plan Address Payor Plan Phone Number Payor Plan Fax Number Effective Dates    [address]   1/1/2024 - None Entered    [facility] [id number]         Subscriber Name Subscriber Birth Date Member ID       EMILIANO MOONEY 1980 [id number]                     Emergency Contacts        (Rel.) Home Phone Work Phone Mobile Phone    KYLE MORALEZ (Spouse) [phone]              [place]: NPI [id number]  Tax ID [id number]     History & Physical        Vitaly Lee MD at 12/01/24 1451          HISTORY AND PHYSICAL   [facility]        Patient Identification:  Name: Emiliano Mooney  Age: 44 " y.o.  Sex: female  :  1980  MRN: [id number]                     Primary Care Physician: Pretty Dean MD    Chief Complaint:  abdominal pain    History of Present Illness:       The patient 44 yr old female presents with complaining of ABD pain. She has been having ABD pain off and on for several months. Her gallbladder has already been removed, but it feels like the pain she had with her gallbladder. Seen in ER 11 days ago for same complaint. States that pain is getting worse. Has not made appointment yet with GI doctor. Has nausea and vomiting off and on. Denies fever or chills. No Diarrhea. States GI cocktail given previously in ER helped some. Pain in mid epigastric region and goes through to her back. Feels tired. Can't get comfortable. Pt is on monjaro, but stated she has had these GI issues before she started on it.  The patient was evaluated at the ER on [facility] and transferred to [facility] for further evaluation treatment with suspected pancreatitis with elevated lipase.  CT of the abdomen did show some dilated biliary ducts.  She reported that she did have stones when she had her gallbladder out but it has been many years ago.    Past Medical History:  History reviewed. No pertinent past medical history.  Past Surgical History:  Past Surgical History:   Procedure Laterality Date    CHOLECYSTECTOMY        Home Meds:  Medications Prior to Admission   Medication Sig Dispense Refill Last Dose/Taking    aluminum-magnesium hydroxide-simethicone (MAALOX) 200-200-20 MG/5ML suspension Take 30 mL by mouth 4 (Four) Times a Day Before Meals & at Bedtime. 1680 mL 0 Taking    hydroCHLOROthiazide 12.5 MG tablet Take 1 tablet by mouth Daily.   Taking    losartan (COZAAR) 100 MG tablet Take 1 tablet by mouth Daily.   Taking    Norgestim-Eth Estrad Triphasic (TRI-SPRINTEC PO) Take  by mouth Daily.   Taking    pantoprazole (PROTONIX) 20 MG EC tablet Take 1 tablet by mouth Daily for 30 days.  30 tablet 0 Taking    Tirzepatide-Weight Management (Zepbound) 2.5 MG/0.5ML solution auto-injector Inject 0.5 mL under the skin into the appropriate area as directed 1 (One) Time Per Week. Takes on Friday   Taking     Current meds    Current Facility-Administered Medications:     acetaminophen (TYLENOL) tablet 650 mg, 650 mg, Oral, Q4H PRN **OR** acetaminophen (TYLENOL) 160 MG/5ML oral solution 650 mg, 650 mg, Oral, Q4H PRN **OR** acetaminophen (TYLENOL) suppository 650 mg, 650 mg, Rectal, Q4H PRN, Vitaly Lee MD    sennosides-docusate (PERICOLACE) 8.6-50 MG per tablet 2 tablet, 2 tablet, Oral, BID PRN **AND** polyethylene glycol (MIRALAX) packet 17 g, 17 g, Oral, Daily PRN **AND** bisacodyl (DULCOLAX) EC tablet 5 mg, 5 mg, Oral, Daily PRN **AND** bisacodyl (DULCOLAX) suppository 10 mg, 10 mg, Rectal, Daily PRN, Vitaly Lee MD    HYDROmorphone (DILAUDID) injection 0.5 mg, 0.5 mg, Intravenous, Q2H PRN, 0.5 mg at 12/01/24 1520 **AND** naloxone (NARCAN) injection 0.4 mg, 0.4 mg, Intravenous, Q5 Min PRN, Vitaly Lee MD    ondansetron ODT (ZOFRAN-ODT) disintegrating tablet 4 mg, 4 mg, Oral, Q6H PRN **OR** ondansetron (ZOFRAN) injection 4 mg, 4 mg, Intravenous, Q6H PRN, Vitaly Lee MD    pantoprazole (PROTONIX) EC tablet 20 mg, 20 mg, Oral, Daily, Vitaly Lee MD    sodium chloride 0.9 % flush 10 mL, 10 mL, Intravenous, Q12H, Vitaly Lee MD    sodium chloride 0.9 % flush 10 mL, 10 mL, Intravenous, PRN, Vitaly Lee MD    sodium chloride 0.9 % infusion 40 mL, 40 mL, Intravenous, PRN, Vitaly Lee MD    sodium chloride 0.9 % infusion, 100 mL/hr, Intravenous, Continuous, Vitaly Lee MD, Last Rate: 100 mL/hr at 12/01/24 1520, 100 mL/hr at 12/01/24 1520  Allergies:  Allergies   Allergen Reactions    Penicillins Hives     Immunizations:    There is no immunization history on file for this patient.  Social History:   Social History     Social History Narrative    Not on file     Social History  "    Socioeconomic History    Marital status:    Tobacco Use    Smoking status: Never   Substance and Sexual Activity    Alcohol use: Not Currently       Family History:  History reviewed. No pertinent family history.     Review of Systems  See history of present illness and past medical history.  Patient denies headache, dizziness, syncope, falls, trauma, change in vision, change in hearing, change in taste, changes in weight, changes in appetite, focal weakness, numbness, or paresthesia.  Patient denies chest pain, palpitations, dyspnea, orthopnea, PND, cough, sinus pressure, rhinorrhea, epistaxis, hemoptysis, nausea, vomiting, hematemesis, diarrhea, constipation or hematochezia. Denies cold or heat intolerance, polydipsia, polyuria, polyphagia. Denies hematuria, pyuria, dysuria, hesitancy, frequency or urgency.   Denies fever, chills, sweats, night sweats.  Denies missing any routine medications. Remainder of ROS is negative.    Objective:  tMax 24 hrs: Temp (24hrs), Av.9 °F (36.6 °C), Min:96.7 °F (35.9 °C), Max:98.7 °F (37.1 °C)    Vitals Ranges:   Temp:  [96.7 °F (35.9 °C)-98.7 °F (37.1 °C)] 96.7 °F (35.9 °C)  Heart Rate:  [65-99] 72  Resp:  [16-18] 18  BP: (147-152)/(80-99) 150/92      Exam:  /92 (BP Location: Left arm, Patient Position: Sitting)   Pulse 72   Temp 96.7 °F (35.9 °C) (Oral)   Resp 18   Ht 164.6 cm (64.8\")   Wt 127 kg (280 lb 13.9 oz)   LMP 10/24/2024   SpO2 97%   BMI 47.03 kg/m²     General Appearance:    Alert, cooperative, no distress, appears stated age   Head:    Normocephalic, without obvious abnormality, atraumatic   Eyes:    PERRL, conjunctivae/corneas clear, EOM's intact, both eyes   Ears:    Normal external ear canals, both ears   Nose:   Nares normal, septum midline, mucosa normal, no drainage    or sinus tenderness   Throat:   Lips, mucosa, and tongue normal   Neck:   Supple, symmetrical, trachea midline, no adenopathy;     thyroid:  no " enlargement/tenderness/nodules; no carotid    bruit or JVD   Back:     Symmetric, no curvature, ROM normal, no CVA tenderness   Lungs:     Clear to auscultation bilaterally, respirations unlabored   Chest Wall:    No tenderness or deformity    Heart:    Regular rate and rhythm, S1 and S2 normal, no murmur, rub   or gallop   Abdomen:     Soft, mild epigastric tenderness, bowel sounds active all four quadrants,     no masses, no hepatomegaly, no splenomegaly   Extremities:   Extremities normal, atraumatic, no cyanosis or edema   Pulses:   2+ and symmetric all extremities   Skin:   Skin color, texture, turgor normal, no rashes or lesions   Lymph nodes:   Cervical, supraclavicular, and axillary nodes normal   Neurologic:   CNII-XII intact, normal strength, sensation intact throughout      .    Data Review:  Lab Results (last 72 hours)       Procedure Component Value Units Date/Time    CBC & Differential [[id number]]  (Abnormal) Collected: 12/01/24 1205    Specimen: Blood Updated: 12/01/24 1208    Narrative:      The following orders were created for panel order CBC & Differential.  Procedure                               Abnormality         Status                     ---------                               -----------         ------                     CBC Auto Differential[[id number]]        Abnormal            Final result                 Please view results for these tests on the individual orders.    CBC Auto Differential [[id number]]  (Abnormal) Collected: 12/01/24 1205    Specimen: Blood Updated: 12/01/24 1208     WBC 10.01 10*3/mm3      RBC 4.94 10*6/mm3      Hemoglobin 14.9 g/dL      Hematocrit 45.3 %      MCV 91.7 fL      MCH 30.2 pg      MCHC 32.9 g/dL      RDW 12.3 %      RDW-SD 42.1 fl      MPV 10.0 fL      Platelets 340 10*3/mm3      Neutrophil % 79.7 %      Lymphocyte % 14.8 %      Monocyte % 4.9 %      Eosinophil % 0.1 %      Basophil % 0.2 %      Immature Grans % 0.3 %      Neutrophils, Absolute 7.98  10*3/mm3      Lymphocytes, Absolute 1.48 10*3/mm3      Monocytes, Absolute 0.49 10*3/mm3      Eosinophils, Absolute 0.01 10*3/mm3      Basophils, Absolute 0.02 10*3/mm3      Immature Grans, Absolute 0.03 10*3/mm3     Comprehensive Metabolic Panel [[id number]]  (Abnormal) Collected: 12/01/24 1035    Specimen: Blood Updated: 12/01/24 1055     Glucose 120 mg/dL      BUN 10 mg/dL      Creatinine 0.87 mg/dL      Sodium 137 mmol/L      Potassium 3.5 mmol/L      Chloride 102 mmol/L      CO2 25.7 mmol/L      Calcium 9.1 mg/dL      Total Protein 6.8 g/dL      Albumin 3.7 g/dL      ALT (SGPT) 181 U/L      AST (SGOT) 212 U/L      Alkaline Phosphatase 177 U/L      Total Bilirubin 1.5 mg/dL      Globulin 3.1 gm/dL      A/G Ratio 1.2 g/dL      BUN/Creatinine Ratio 11.5     Anion Gap 9.3 mmol/L      eGFR 84.9 mL/min/1.73     Narrative:      GFR Normal >60  Chronic Kidney Disease <60  Kidney Failure <15      Lipase [[id number]]  (Abnormal) Collected: 12/01/24 1035    Specimen: Blood Updated: 12/01/24 1055     Lipase 2,527 U/L                      Imaging Results (All)       Procedure Component Value Units Date/Time    CT Abdomen Pelvis Without Contrast [[id number]] Collected: 12/01/24 1137     Updated: 12/01/24 1151    Narrative:      CT ABDOMEN AND PELVIS WITHOUT IV CONTRAST     HISTORY: Abdominal pain     TECHNIQUE: Radiation dose reduction techniques were utilized, including  automated exposure control and exposure modulation based on body size.   3 mm images were obtained through the abdomen and pelvis without IV  contrast.     COMPARISON: None     FINDINGS:  Sub-6 mm pulm nodule in the right middle lobe. No findings of small  bowel obstruction. The appendix unremarkable.     The liver, pancreas, spleen, adrenal glands and kidneys have an  unremarkable noncontrast CT appearance. No hydronephrosis no abdominal  pelvic adenopathy by size criteria. Bladder is decompressed and cannot  be evaluated. No free intraperitoneal air is seen.  No suspicious lytic  or blastic osseous lesions       Impression:      1.  No measurable loculated peripancreatic fluid collection is seen.  However, please note evaluation is suboptimal without intravenous  contrast. Mild to moderate intra and extra medical duct dilation status  post cholecystectomy. While findings may be post surgical, they remain  indeterminant given patient's elevated liver enzymes and lipase levels  and biliary obstruction cannot be excluded. Further evaluation with  abdominal MRI and MRCP should be considered if there is clinical concern  for biliary obstruction..  2.  If this patient is at increased risk for lung cancer, follow-up  chest CT in 12 months can be considered to ensure stability. If this  patient is not at increased risk for lung cancer, no further follow-up  is necessary per Fleischner criteria.  3.  Other findings as above           This report was finalized on 12/1/2024 11:48 AM by Dr. Deni Marie M.D on Workstation: [id number]             History reviewed. No pertinent past medical history.    Assessment:  Active Hospital Problems    Diagnosis  POA    **Pancreatitis [K85.90]  Yes    HTN (hypertension) [I10]  Unknown    Obesity, Class III, BMI 40-49.9 (morbid obesity) [E66.01]  Unknown      Resolved Hospital Problems   No resolved problems to display.       Plan:  Will make her n.p.o. and give IV fluid for hydration with pain and nausea medicine.  Will get MRI MRCP of pancreas.  Follow-up on labs.  Will ask for GI consult.    Vitaly Lee MD  12/1/2024  15:43 EST     Electronically signed by Vitaly Lee MD at 12/01/24 1544          Emergency Department Notes        Linette Haro APRN at 12/01/24 1325       Attestation signed by Sherif Novoa MD at 12/02/24 1016        SUPERVISE: For this patient encounter, I reviewed the APC's documentation, treatment plan, and medical decision making.  Sherif Novoa MD 12/2/2024 22:22 EST                         Subjective   History of  Present Illness  44 yr old female presents with C/O ABD pain.  She has been having ABD pain off and on for several months.  Her gallbladder has already been removed, but it feels like the pain she had with her gallbladder.   Seen in ER 11 days ago for same complaint.  States that pain is getting worse.  Has not made appointment yet with GI doctor. Has nausea and vomiting off and on.  Denies fever or chills.  No Diarrhea.  States GI cocktail given previously in ER helped some.  Pain in mid epigastric region and goes through to her back.  Feels tired.  Can't get comfortable.  Pt is on monjaro, but stated she has had these GI issues before she started on it.        Review of Systems   Constitutional: Negative.    HENT: Negative.     Respiratory: Negative.     Gastrointestinal:  Positive for abdominal pain, nausea and vomiting. Negative for diarrhea.   Genitourinary: Negative.        History reviewed. No pertinent past medical history.    Allergies   Allergen Reactions    Penicillins Hives       Past Surgical History:   Procedure Laterality Date    CHOLECYSTECTOMY         History reviewed. No pertinent family history.    Social History     Socioeconomic History    Marital status:    Tobacco Use    Smoking status: Never   Substance and Sexual Activity    Alcohol use: Not Currently           Objective   Physical Exam  Vitals and nursing note reviewed.   Constitutional:       Appearance: She is normal weight.   Pulmonary:      Effort: Pulmonary effort is normal.      Breath sounds: Normal breath sounds and air entry.   Abdominal:      General: Abdomen is protuberant. Bowel sounds are normal.      Palpations: Abdomen is soft.      Tenderness: There is abdominal tenderness in the epigastric area. There is no right CVA tenderness, left CVA tenderness, guarding or rebound. Negative signs include Schmidt's sign and Rovsing's sign.   Musculoskeletal:        Back:       Comments: Pain is in mid back, but not tender and no  pain with palation.   Skin:     General: Skin is warm and dry.      Capillary Refill: Capillary refill takes less than 2 seconds.   Neurological:      Mental Status: She is alert.         Procedures          ED Course  ED Course as of 12/01/24 1907   Sun Dec 01, 2024   1336 Discussed findings with patient. [JJ]      ED Course User Index  [JJ] Linette Haro APRN      Decision to admit.  Dr Lee at [facility].                                     Medical Decision Making  Diff DX:  ABD pain, gastritis, kidney infection, appendicitis, pancrease, ulcer    Problems Addressed:  Acute pancreatitis, unspecified complication status, unspecified pancreatitis type: complicated acute illness or injury    Amount and/or Complexity of Data Reviewed  Labs: ordered.  Radiology: ordered.    Risk  OTC drugs.  Prescription drug management.  Decision regarding hospitalization.        Final diagnoses:   Acute pancreatitis, unspecified complication status, unspecified pancreatitis type       ED Disposition  ED Disposition       ED Disposition   Decision to Admit    Condition   --    Comment   Level of Care: Med/Surg [1]   Diagnosis: Pancreatitis [202663]   Admitting Physician: VITALY LEE [[id number]]   Attending Physician: VITALY LEE [[id number]]   Isolate for COVID?: No [0]   Certification: I Certify That Inpatient Hospital Services Are Medically Necessary For Greater Than 2 Midnights                 No follow-up provider specified.       Medication List      No changes were made to your prescriptions during this visit.           Electronically signed by Sherif Novoa MD at 12/02/24 2223       Facility-Administered Medications as of 12/4/2024   Medication Dose Route Frequency Provider Last Rate Last Admin    acetaminophen (TYLENOL) tablet 650 mg  650 mg Oral Q4H PRN Vitaly Lee MD   650 mg at 12/03/24 1247    Or    acetaminophen (TYLENOL) 160 MG/5ML oral solution 650 mg  650 mg Oral Q4H PRN Vitaly Lee MD        Or     acetaminophen (TYLENOL) suppository 650 mg  650 mg Rectal Q4H PRN Vitaly Lee MD        [COMPLETED] aluminum-magnesium hydroxide-simethicone (MAALOX MAX) 400-400-40 MG/5ML suspension 30 mL  30 mL Oral Once Linette Haro APRN   30 mL at 12/01/24 1049    sennosides-docusate (PERICOLACE) 8.6-50 MG per tablet 2 tablet  2 tablet Oral BID PRN Vitaly Lee MD        And    polyethylene glycol (MIRALAX) packet 17 g  17 g Oral Daily PRN Vitaly Lee MD        And    bisacodyl (DULCOLAX) EC tablet 5 mg  5 mg Oral Daily PRN Vitaly Lee MD        And    bisacodyl (DULCOLAX) suppository 10 mg  10 mg Rectal Daily PRN Vitaly Lee MD        [COMPLETED] gadobenate dimeglumine (MULTIHANCE) injection 20 mL  20 mL Intravenous Once in imaging Vitaly Lee MD   20 mL at 12/02/24 1552    [COMPLETED] HYDROmorphone (DILAUDID) injection 0.5 mg  0.5 mg Intravenous Once Linette Haro APRN   0.5 mg at 12/01/24 1159    HYDROmorphone (DILAUDID) injection 0.5 mg  0.5 mg Intravenous Q2H PRN Vitaly Lee MD   0.5 mg at 12/04/24 0300    And    naloxone (NARCAN) injection 0.4 mg  0.4 mg Intravenous Q5 Min PRN Vitaly Lee MD        melatonin tablet 5 mg  5 mg Oral Nightly PRN Simran Worthington APRN   5 mg at 12/04/24 0001    [COMPLETED] ondansetron (ZOFRAN) injection 4 mg  4 mg Intravenous Once Linette Haro APRN   4 mg at 12/01/24 1202    ondansetron ODT (ZOFRAN-ODT) disintegrating tablet 4 mg  4 mg Oral Q6H PRN Vitaly Lee MD        Or    ondansetron (ZOFRAN) injection 4 mg  4 mg Intravenous Q6H PRN Vitaly Lee MD   4 mg at 12/02/24 1457    pantoprazole (PROTONIX) EC tablet 40 mg  40 mg Oral Daily Vitaly Lee MD   40 mg at 12/04/24 0842    prochlorperazine (COMPAZINE) injection 10 mg  10 mg Intravenous Q6H PRN Vitaly Lee MD   10 mg at 12/02/24 1818    sodium chloride 0.9 % flush 10 mL  10 mL Intravenous Q12H Vitaly Lee MD   10 mL at 12/03/24 2054    sodium chloride 0.9 % flush 10 mL  10 mL Intravenous PRN  Vitaly Lee MD        sodium chloride 0.9 % infusion 40 mL  40 mL Intravenous PRN Vitaly Lee MD        [] sodium chloride 0.9 % infusion  100 mL/hr Intravenous Continuous Vitaly Lee  mL/hr at 24 1520 100 mL/hr at 24 1520    sodium chloride 0.9 % infusion  100 mL/hr Intravenous Continuous Vitaly Lee  mL/hr at 24 0832 100 mL/hr at 24 0832     Lab Results (last 72 hours)       Procedure Component Value Units Date/Time    Lipase [[id number]]  (Abnormal) Collected: 24    Specimen: Blood Updated: 24     Lipase 504 U/L     Comprehensive Metabolic Panel [[id number]]  (Abnormal) Collected: 24    Specimen: Blood Updated: 24     Glucose 94 mg/dL      BUN 8 mg/dL      Creatinine 0.80 mg/dL      Sodium 142 mmol/L      Potassium 3.8 mmol/L      Chloride 107 mmol/L      CO2 21.0 mmol/L      Calcium 8.4 mg/dL      Total Protein 6.2 g/dL      Albumin 3.3 g/dL      ALT (SGPT) 290 U/L      AST (SGOT) 242 U/L      Alkaline Phosphatase 247 U/L      Total Bilirubin 5.8 mg/dL      Globulin 2.9 gm/dL      A/G Ratio 1.1 g/dL      BUN/Creatinine Ratio 10.0     Anion Gap 14.0 mmol/L      eGFR 93.9 mL/min/1.73     Narrative:      GFR Normal >60  Chronic Kidney Disease <60  Kidney Failure <15      CBC & Differential [[id number]]  (Abnormal) Collected: 24    Specimen: Blood Updated: 24    Narrative:      The following orders were created for panel order CBC & Differential.  Procedure                               Abnormality         Status                     ---------                               -----------         ------                     CBC Auto Differential[[id number]]        Abnormal            Final result                 Please view results for these tests on the individual orders.    CBC Auto Differential [[id number]]  (Abnormal) Collected: 24    Specimen: Blood Updated: 24     WBC 9.18  10*3/mm3      RBC 4.48 10*6/mm3      Hemoglobin 13.1 g/dL      Hematocrit 40.1 %      MCV 89.5 fL      MCH 29.2 pg      MCHC 32.7 g/dL      RDW 12.5 %      RDW-SD 40.8 fl      MPV 9.3 fL      Platelets 328 10*3/mm3      Neutrophil % 68.1 %      Lymphocyte % 24.3 %      Monocyte % 4.9 %      Eosinophil % 1.2 %      Basophil % 0.7 %      Immature Grans % 0.8 %      Neutrophils, Absolute 6.26 10*3/mm3      Lymphocytes, Absolute 2.23 10*3/mm3      Monocytes, Absolute 0.45 10*3/mm3      Eosinophils, Absolute 0.11 10*3/mm3      Basophils, Absolute 0.06 10*3/mm3      Immature Grans, Absolute 0.07 10*3/mm3      nRBC 0.0 /100 WBC     Bilirubin, Total & Direct [[id number]]  (Abnormal) Collected: 12/03/24 0613    Specimen: Blood Updated: 12/03/24 1633     Total Bilirubin 4.1 mg/dL      Bilirubin, Direct 3.5 mg/dL      Bilirubin, Indirect 0.6 mg/dL     Lipase [[id number]]  (Abnormal) Collected: 12/03/24 0613    Specimen: Blood Updated: 12/03/24 0700     Lipase 539 U/L     Comprehensive Metabolic Panel [[id number]]  (Abnormal) Collected: 12/03/24 0613    Specimen: Blood Updated: 12/03/24 0652     Glucose 96 mg/dL      BUN 9 mg/dL      Creatinine 0.84 mg/dL      Sodium 142 mmol/L      Potassium 3.4 mmol/L      Chloride 109 mmol/L      CO2 22.0 mmol/L      Calcium 8.0 mg/dL      Total Protein 6.1 g/dL      Albumin 2.7 g/dL      ALT (SGPT) 304 U/L      AST (SGOT) 307 U/L      Alkaline Phosphatase 214 U/L      Total Bilirubin 4.2 mg/dL      Globulin 3.4 gm/dL      A/G Ratio 0.8 g/dL      BUN/Creatinine Ratio 10.7     Anion Gap 11.0 mmol/L      eGFR 88.6 mL/min/1.73     Narrative:      GFR Normal >60  Chronic Kidney Disease <60  Kidney Failure <15      CBC & Differential [[id number]]  (Abnormal) Collected: 12/03/24 0613    Specimen: Blood Updated: 12/03/24 0649    Narrative:      The following orders were created for panel order CBC & Differential.  Procedure                               Abnormality         Status                      ---------                               -----------         ------                     CBC Auto Differential[[id number]]        Abnormal            Final result                 Please view results for these tests on the individual orders.    CBC Auto Differential [[id number]]  (Abnormal) Collected: 12/03/24 0613    Specimen: Blood Updated: 12/03/24 0649     WBC 8.57 10*3/mm3      RBC 4.25 10*6/mm3      Hemoglobin 12.8 g/dL      Hematocrit 37.5 %      MCV 88.2 fL      MCH 30.1 pg      MCHC 34.1 g/dL      RDW 12.5 %      RDW-SD 40.2 fl      MPV 9.4 fL      Platelets 302 10*3/mm3      Neutrophil % 62.4 %      Lymphocyte % 28.2 %      Monocyte % 6.7 %      Eosinophil % 1.4 %      Basophil % 0.7 %      Immature Grans % 0.6 %      Neutrophils, Absolute 5.35 10*3/mm3      Lymphocytes, Absolute 2.42 10*3/mm3      Monocytes, Absolute 0.57 10*3/mm3      Eosinophils, Absolute 0.12 10*3/mm3      Basophils, Absolute 0.06 10*3/mm3      Immature Grans, Absolute 0.05 10*3/mm3      nRBC 0.0 /100 WBC     Lipase [[id number]]  (Abnormal) Collected: 12/02/24 0700    Specimen: Blood Updated: 12/02/24 0805     Lipase 1,846 U/L     Comprehensive Metabolic Panel [[id number]]  (Abnormal) Collected: 12/02/24 0700    Specimen: Blood Updated: 12/02/24 0756     Glucose 120 mg/dL      BUN 9 mg/dL      Creatinine 0.85 mg/dL      Sodium 140 mmol/L      Potassium 3.3 mmol/L      Chloride 106 mmol/L      CO2 25.3 mmol/L      Calcium 8.5 mg/dL      Total Protein 6.1 g/dL      Albumin 3.3 g/dL      ALT (SGPT) 271 U/L      AST (SGOT) 357 U/L      Alkaline Phosphatase 190 U/L      Total Bilirubin 3.2 mg/dL      Globulin 2.8 gm/dL      A/G Ratio 1.2 g/dL      BUN/Creatinine Ratio 10.6     Anion Gap 8.7 mmol/L      eGFR 87.3 mL/min/1.73     Narrative:      GFR Normal >60  Chronic Kidney Disease <60  Kidney Failure <15      CBC Auto Differential [[id number]]  (Normal) Collected: 12/02/24 0700    Specimen: Blood Updated: 12/02/24 0742     WBC 9.25 10*3/mm3       RBC 4.53 10*6/mm3      Hemoglobin 13.7 g/dL      Hematocrit 39.7 %      MCV 87.6 fL      MCH 30.2 pg      MCHC 34.5 g/dL      RDW 12.4 %      RDW-SD 39.6 fl      MPV 9.2 fL      Platelets 323 10*3/mm3      Neutrophil % 65.0 %      Lymphocyte % 27.0 %      Monocyte % 6.1 %      Eosinophil % 1.2 %      Basophil % 0.5 %      Immature Grans % 0.2 %      Neutrophils, Absolute 6.01 10*3/mm3      Lymphocytes, Absolute 2.50 10*3/mm3      Monocytes, Absolute 0.56 10*3/mm3      Eosinophils, Absolute 0.11 10*3/mm3      Basophils, Absolute 0.05 10*3/mm3      Immature Grans, Absolute 0.02 10*3/mm3      nRBC 0.0 /100 WBC           Imaging Results (Last 72 Hours)       Procedure Component Value Units Date/Time    MRI abdomen w wo contrast mrcp [[id number]] Collected: 12/02/24 1635     Updated: 12/02/24 1700    Narrative:      MRI ABDOMEN WITH AND WITHOUT CONTRAST     HISTORY: Pancreatitis with biliary duct dilation.     TECHNIQUE: Multiplanar multisequence MRI of the abdomen was performed  before and after the administration of IV Multihance.      COMPARISON: CT abdomen pelvis 12/1/2024.     FINDINGS: Preserved pancreas parenchymal volume. Nondilated main  pancreatic duct. Conventional main pancreatic ductal anatomy. Mildly  decreased intrinsic T1 hyperintense signal in the pancreatic head  (series 12/image 37). Peripancreatic T2 hyperintense signal/edema and  small amount of nonorganized free fluid adjacent to the pancreatic head  (series 7/image 22). Pancreas parenchyma enhances in its entirety. No  organized pancreatic or peripancreatic fluid collection. No focal  pancreatic lesion. Superimposed pancreatitis does partially limit  evaluation for underlying lesion. Patent regional vasculature without  surrounding soft tissue or appreciable pseudoaneurysm. Evaluation for  pseudoaneurysm is limited via MRI. No abdominal lymphadenopathy.     Cholecystectomy. Mildly dilated biliary tree with the common duct  measuring up to 1.1 cm  (series 9/image 9). Common bile duct remains  dilated to the level of the ampulla. No evidence of choledocholithiasis.  No abnormal enhancement or soft tissue prominence at the level of the  ampulla.     Noncirrhotic liver morphology. No significant steatosis or iron  overload. Conventional hepatic arterial anatomy. Patent hepatic and  portal veins. No focal hepatic lesion.     Normal spleen, adrenal glands, kidneys and visualized bowel. No  enhancing osseous lesion.             Impression:      1. Acute interstitial edematous pancreatitis centered at the pancreatic  head. No organized pancreatic or peripancreatic fluid collection.  2. Post cholecystectomy. Mildly dilated biliary tree. No evidence of  choledocholithiasis. Biliary duct dilation is favored secondary to  reservoir effect post cholecystectomy.        This report was finalized on 2024 4:57 PM by Dr. Prem Flores M.D on Workstation: [id number]                Physician Progress Notes (last 72 hours)        Vitaly Lee MD at 24 1610          DAILY PROGRESS NOTE  [facility]    Patient Identification:  Name: Racquel Degroot  Age: 44 y.o.  Sex: female  :  1980  MRN: [id number]         Primary Care Physician: Pretty Dean MD    Subjective:  Interval History: She continues to have abdominal pain with nausea and vomiting.  She feels a little bit better today but is starting to get some jaundice.    Objective:    Scheduled Meds:pantoprazole, 40 mg, Oral, Daily  sodium chloride, 10 mL, Intravenous, Q12H      Continuous Infusions:sodium chloride, 100 mL/hr, Last Rate: 100 mL/hr (24 1248)        Vital signs in last 24 hours:  Temp:  [97.3 °F (36.3 °C)-98.3 °F (36.8 °C)] 97.3 °F (36.3 °C)  Heart Rate:  [66-94] 66  Resp:  [16-18] 18  BP: (121-136)/(70-79) 136/79    Intake/Output:    Intake/Output Summary (Last 24 hours) at 12/3/2024 1610  Last data filed at 12/3/2024 0915  Gross per 24 hour   Intake 1131.67  "ml   Output 300 ml   Net 831.67 ml       Exam:  /79 (BP Location: Right arm, Patient Position: Lying)   Pulse 66   Temp 97.3 °F (36.3 °C) (Oral)   Resp 18   Ht 164.6 cm (64.8\")   Wt 127 kg (280 lb 13.9 oz)   LMP 10/24/2024   SpO2 97%   BMI 47.03 kg/m²     General Appearance:    Alert, cooperative, no distress   Head:    Normocephalic, without obvious abnormality, atraumatic   Eyes:       Throat:   Lips, tongue, gums normal   Neck:   Supple, symmetrical, trachea midline, no JVD   Lungs:     Clear to auscultation bilaterally, respirations unlabored   Chest Wall:    No tenderness or deformity    Heart:    Regular rate and rhythm, S1 and S2 normal, no murmur,no  rub or gallop   Abdomen:     Soft, mild epigastric tenderness, bowel sounds active, no masses, no organomegaly    Extremities:   Extremities normal, atraumatic, no cyanosis or edema   Pulses:      Skin:   Skin is warm and dry,  no rashes or palpable lesions   Neurologic:   no focal deficits noted      Lab Results (last 72 hours)       Procedure Component Value Units Date/Time    Lipase [[id number]]  (Abnormal) Collected: 12/02/24 0700    Specimen: Blood Updated: 12/02/24 0805     Lipase 1,846 U/L     Comprehensive Metabolic Panel [[id number]]  (Abnormal) Collected: 12/02/24 0700    Specimen: Blood Updated: 12/02/24 0756     Glucose 120 mg/dL      BUN 9 mg/dL      Creatinine 0.85 mg/dL      Sodium 140 mmol/L      Potassium 3.3 mmol/L      Chloride 106 mmol/L      CO2 25.3 mmol/L      Calcium 8.5 mg/dL      Total Protein 6.1 g/dL      Albumin 3.3 g/dL      ALT (SGPT) 271 U/L      AST (SGOT) 357 U/L      Alkaline Phosphatase 190 U/L      Total Bilirubin 3.2 mg/dL      Globulin 2.8 gm/dL      A/G Ratio 1.2 g/dL      BUN/Creatinine Ratio 10.6     Anion Gap 8.7 mmol/L      eGFR 87.3 mL/min/1.73     Narrative:      GFR Normal >60  Chronic Kidney Disease <60  Kidney Failure <15      CBC Auto Differential [[id number]]  (Normal) Collected: 12/02/24 0700    " Specimen: Blood Updated: 12/02/24 0742     WBC 9.25 10*3/mm3      RBC 4.53 10*6/mm3      Hemoglobin 13.7 g/dL      Hematocrit 39.7 %      MCV 87.6 fL      MCH 30.2 pg      MCHC 34.5 g/dL      RDW 12.4 %      RDW-SD 39.6 fl      MPV 9.2 fL      Platelets 323 10*3/mm3      Neutrophil % 65.0 %      Lymphocyte % 27.0 %      Monocyte % 6.1 %      Eosinophil % 1.2 %      Basophil % 0.5 %      Immature Grans % 0.2 %      Neutrophils, Absolute 6.01 10*3/mm3      Lymphocytes, Absolute 2.50 10*3/mm3      Monocytes, Absolute 0.56 10*3/mm3      Eosinophils, Absolute 0.11 10*3/mm3      Basophils, Absolute 0.05 10*3/mm3      Immature Grans, Absolute 0.02 10*3/mm3      nRBC 0.0 /100 WBC     CBC & Differential [[id number]]  (Abnormal) Collected: 12/01/24 1205    Specimen: Blood Updated: 12/01/24 1208    Narrative:      The following orders were created for panel order CBC & Differential.  Procedure                               Abnormality         Status                     ---------                               -----------         ------                     CBC Auto Differential[[id number]]        Abnormal            Final result                 Please view results for these tests on the individual orders.    CBC Auto Differential [[id number]]  (Abnormal) Collected: 12/01/24 1205    Specimen: Blood Updated: 12/01/24 1208     WBC 10.01 10*3/mm3      RBC 4.94 10*6/mm3      Hemoglobin 14.9 g/dL      Hematocrit 45.3 %      MCV 91.7 fL      MCH 30.2 pg      MCHC 32.9 g/dL      RDW 12.3 %      RDW-SD 42.1 fl      MPV 10.0 fL      Platelets 340 10*3/mm3      Neutrophil % 79.7 %      Lymphocyte % 14.8 %      Monocyte % 4.9 %      Eosinophil % 0.1 %      Basophil % 0.2 %      Immature Grans % 0.3 %      Neutrophils, Absolute 7.98 10*3/mm3      Lymphocytes, Absolute 1.48 10*3/mm3      Monocytes, Absolute 0.49 10*3/mm3      Eosinophils, Absolute 0.01 10*3/mm3      Basophils, Absolute 0.02 10*3/mm3      Immature Grans, Absolute 0.03 10*3/mm3   "   Comprehensive Metabolic Panel [[id number]]  (Abnormal) Collected: 12/01/24 1035    Specimen: Blood Updated: 12/01/24 1055     Glucose 120 mg/dL      BUN 10 mg/dL      Creatinine 0.87 mg/dL      Sodium 137 mmol/L      Potassium 3.5 mmol/L      Chloride 102 mmol/L      CO2 25.7 mmol/L      Calcium 9.1 mg/dL      Total Protein 6.8 g/dL      Albumin 3.7 g/dL      ALT (SGPT) 181 U/L      AST (SGOT) 212 U/L      Alkaline Phosphatase 177 U/L      Total Bilirubin 1.5 mg/dL      Globulin 3.1 gm/dL      A/G Ratio 1.2 g/dL      BUN/Creatinine Ratio 11.5     Anion Gap 9.3 mmol/L      eGFR 84.9 mL/min/1.73     Narrative:      GFR Normal >60  Chronic Kidney Disease <60  Kidney Failure <15      Lipase [[id number]]  (Abnormal) Collected: 12/01/24 1035    Specimen: Blood Updated: 12/01/24 1055     Lipase 2,527 U/L           Data Review:  Results from last 7 days   Lab Units 12/03/24  0613 12/02/24  0700 12/01/24  1035   SODIUM mmol/L 142 140 137   POTASSIUM mmol/L 3.4* 3.3* 3.5   CHLORIDE mmol/L 109* 106 102   CO2 mmol/L 22.0 25.3 25.7   BUN mg/dL 9 9 10   CREATININE mg/dL 0.84 0.85 0.87   GLUCOSE mg/dL 96 120* 120*   CALCIUM mg/dL 8.0* 8.5* 9.1     Results from last 7 days   Lab Units 12/03/24  0613 12/02/24  0700 12/01/24  1205   WBC 10*3/mm3 8.57 9.25 10.01   HEMOGLOBIN g/dL 12.8 13.7 14.9   HEMATOCRIT % 37.5 39.7 45.3   PLATELETS 10*3/mm3 302 323 340             No results found for: \"TROPONINT\"      Results from last 7 days   Lab Units 12/03/24  0613 12/02/24  0700 12/01/24  1035   ALK PHOS U/L 214* 190* 177*   BILIRUBIN mg/dL 4.2* 3.2* 1.5*   ALT (SGPT) U/L 304* 271* 181*   AST (SGOT) U/L 307* 357* 212*             No results found for: \"POCGLU\"        History reviewed. No pertinent past medical history.    Assessment:  Active Hospital Problems    Diagnosis  POA    **Pancreatitis [K85.90]  Yes    HTN (hypertension) [I10]  Unknown    Obesity, Class III, BMI 40-49.9 (morbid obesity) [E66.01]  Unknown      Resolved Hospital " Problems   No resolved problems to display.       Plan:  Results of MRCP noted..  Continue with IV fluids with pain and nausea medicine.  Await recommendations from GI medicine.  May need ERCP. liver tests continue to elevate but lipase is lower.  Follow-up on labs.  Continue with bowel rest but will give her a few ice chips.    Vitaly Lee MD  12/3/2024  16:10 EST     Electronically signed by Vitaly Lee MD at 12/03/24 1611       Gisele Maciel PA-C at 12/03/24 0895       Attestation signed by Jesus Skelton MD at 12/03/24 4854    I have reviewed this documentation and agree.                  [facility]  Inpatient Progress Note    Reason for Follow Up:  pancreatitis /abd pain     Subjective     Interval History:   Patient reports she is feeling a little bit better today.  Abdominal pain is improving.  No nausea or vomiting.  She is currently not had anything to eat.    LFTs are worsening and bilirubin is now 4.2    MRCP completed yesterday with acute interstitial edematous pancreatitis centered at pancreatic head with no fluid collection.  Mild dilated biliary tree with no evidence of choledocholithiasis-dilation is favored secondary to reservoir effect postcholecystectomy.    Current Facility-Administered Medications:     acetaminophen (TYLENOL) tablet 650 mg, 650 mg, Oral, Q4H PRN **OR** acetaminophen (TYLENOL) 160 MG/5ML oral solution 650 mg, 650 mg, Oral, Q4H PRN **OR** acetaminophen (TYLENOL) suppository 650 mg, 650 mg, Rectal, Q4H PRN, Vitaly Lee MD    sennosides-docusate (PERICOLACE) 8.6-50 MG per tablet 2 tablet, 2 tablet, Oral, BID PRN **AND** polyethylene glycol (MIRALAX) packet 17 g, 17 g, Oral, Daily PRN **AND** bisacodyl (DULCOLAX) EC tablet 5 mg, 5 mg, Oral, Daily PRN **AND** bisacodyl (DULCOLAX) suppository 10 mg, 10 mg, Rectal, Daily PRN, Vitaly Lee MD    HYDROmorphone (DILAUDID) injection 0.5 mg, 0.5 mg, Intravenous, Q2H PRN, 0.5 mg at 12/02/24 1401  **AND** naloxone (NARCAN) injection 0.4 mg, 0.4 mg, Intravenous, Q5 Min PRN, Vitaly Lee MD    melatonin tablet 5 mg, 5 mg, Oral, Nightly PRN, Simran Worthington, APRN, 5 mg at 12/03/24 0315    ondansetron ODT (ZOFRAN-ODT) disintegrating tablet 4 mg, 4 mg, Oral, Q6H PRN **OR** ondansetron (ZOFRAN) injection 4 mg, 4 mg, Intravenous, Q6H PRN, Vitaly Lee MD, 4 mg at 12/02/24 1457    pantoprazole (PROTONIX) EC tablet 40 mg, 40 mg, Oral, Daily, Vitaly Lee MD, 40 mg at 12/02/24 0907    prochlorperazine (COMPAZINE) injection 10 mg, 10 mg, Intravenous, Q6H PRN, Vitaly Lee MD, 10 mg at 12/02/24 1818    sodium chloride 0.9 % flush 10 mL, 10 mL, Intravenous, Q12H, Vitaly Lee MD, 10 mL at 12/02/24 2010    sodium chloride 0.9 % flush 10 mL, 10 mL, Intravenous, PRN, Vitaly Lee MD    sodium chloride 0.9 % infusion 40 mL, 40 mL, Intravenous, PRN, Vitaly Lee MD    sodium chloride 0.9 % infusion, 100 mL/hr, Intravenous, Continuous, Vitaly Lee MD, Last Rate: 100 mL/hr at 12/03/24 0316, 100 mL/hr at 12/03/24 0316    Objective     Vital Signs  Temp:  [97 °F (36.1 °C)-98.3 °F (36.8 °C)] 98.3 °F (36.8 °C)  Heart Rate:  [70-94] 70  Resp:  [16-18] 18  BP: (110-139)/(70-81) 122/70  Body mass index is 47.03 kg/m².    Intake/Output Summary (Last 24 hours) at 12/3/2024 0833  Last data filed at 12/3/2024 0316  Gross per 24 hour   Intake 1131.67 ml   Output 100 ml   Net 1031.67 ml     No intake/output data recorded.     Physical Exam:   General: patient awake, alert and cooperative   Eyes: Normal lids and lashes, no scleral icterus   Pulm: regular and unlabored   Abdomen: soft, mild tenderness to palpation, nondistended; normal bowel sounds     Results Review:     I reviewed the patient's new clinical results.    Results from last 7 days   Lab Units 12/03/24  0613 12/02/24  0700 12/01/24  1205   WBC 10*3/mm3 8.57 9.25 10.01   HEMOGLOBIN g/dL 12.8 13.7 14.9   HEMATOCRIT % 37.5 39.7 45.3   PLATELETS 10*3/mm3 302 323  340     Results from last 7 days   Lab Units 12/03/24  0613 12/02/24  0700 12/01/24  1035   SODIUM mmol/L 142 140 137   POTASSIUM mmol/L 3.4* 3.3* 3.5   CHLORIDE mmol/L 109* 106 102   CO2 mmol/L 22.0 25.3 25.7   BUN mg/dL 9 9 10   CREATININE mg/dL 0.84 0.85 0.87   CALCIUM mg/dL 8.0* 8.5* 9.1   BILIRUBIN mg/dL 4.2* 3.2* 1.5*   ALK PHOS U/L 214* 190* 177*   ALT (SGPT) U/L 304* 271* 181*   AST (SGOT) U/L 307* 357* 212*   GLUCOSE mg/dL 96 120* 120*         Lab Results   Lab Value Date/Time    LIPASE 539 (H) 12/03/2024 0613    LIPASE 1,846 (H) 12/02/2024 0700    LIPASE 2,527 (H) 12/01/2024 1035       Radiology:  MRI abdomen w wo contrast mrcp   Final Result   1. Acute interstitial edematous pancreatitis centered at the pancreatic   head. No organized pancreatic or peripancreatic fluid collection.   2. Post cholecystectomy. Mildly dilated biliary tree. No evidence of   choledocholithiasis. Biliary duct dilation is favored secondary to   reservoir effect post cholecystectomy.           This report was finalized on 12/2/2024 4:57 PM by Dr. Prem Flores M.D on Workstation: [id number]          CT Abdomen Pelvis Without Contrast   Final Result   1.  No measurable loculated peripancreatic fluid collection is seen.   However, please note evaluation is suboptimal without intravenous   contrast. Mild to moderate intra and extra medical duct dilation status   post cholecystectomy. While findings may be post surgical, they remain   indeterminant given patient's elevated liver enzymes and lipase levels   and biliary obstruction cannot be excluded. Further evaluation with   abdominal MRI and MRCP should be considered if there is clinical concern   for biliary obstruction..   2.  If this patient is at increased risk for lung cancer, follow-up   chest CT in 12 months can be considered to ensure stability. If this   patient is not at increased risk for lung cancer, no further follow-up   is necessary per Fleischner criteria.    3.  Other findings as above               This report was finalized on 2024 11:48 AM by Dr. Deni Marie M.D on Workstation: [id number]              Assessment & Plan     Active Hospital Problems    Diagnosis     **Pancreatitis     HTN (hypertension)     Obesity, Class III, BMI 40-49.9 (morbid obesity)        Assessment:  Epigastric abdominal pain  Elevated lipase - findings consistent with acute pancreatitis   Elevated LFTs   CT with mild to moderate intra and extra hepatic ductal dilation s/p CCY   Mounjaro therapy     All problems are new to me today     Plan:  Her symptoms are improving however her LFTs continue to worsen.  MRI reviewed and discussed with biliary team-there may be slight filling defect at the ampulla upon their review, but given improvement in symptoms and current episode of pancreatitis without signs of cholangitis will monitor LFTs- If they continue to worsen by tomorrow may need to consider ERCP at that time.  Regardless, she may benefit from ERCP/EUS at some point in the future  Continue supportive care.    Patient and plan of care discussed w/ attending, Dr. Costa Maciel   [facility]  [phone]      Electronically signed by Jesus Skelton MD at 24 0996       Vitaly Lee MD at 24 1352          DAILY PROGRESS NOTE  [facility]    Patient Identification:  Name: Racquel Degroot  Age: 44 y.o.  Sex: female  :  1980  MRN: [id number]         Primary Care Physician: Pretty Dean MD    Subjective:  Interval History: She continues to have abdominal pain with nausea and vomiting.    Objective:    Scheduled Meds:pantoprazole, 40 mg, Oral, Daily  sodium chloride, 10 mL, Intravenous, Q12H      Continuous Infusions:     Vital signs in last 24 hours:  Temp:  [96.7 °F (35.9 °C)-98 °F (36.7 °C)] 98 °F (36.7 °C)  Heart Rate:  [72-92] 79  Resp:  [16-18] 18  BP: (110-150)/(73-92)  "139/81    Intake/Output:    Intake/Output Summary (Last 24 hours) at 12/2/2024 1352  Last data filed at 12/2/2024 1030  Gross per 24 hour   Intake --   Output 400 ml   Net -400 ml       Exam:  /81 (BP Location: Right arm, Patient Position: Sitting)   Pulse 79   Temp 98 °F (36.7 °C) (Oral)   Resp 18   Ht 164.6 cm (64.8\")   Wt 127 kg (280 lb 13.9 oz)   LMP 10/24/2024   SpO2 95%   BMI 47.03 kg/m²     General Appearance:    Alert, cooperative, no distress   Head:    Normocephalic, without obvious abnormality, atraumatic   Eyes:       Throat:   Lips, tongue, gums normal   Neck:   Supple, symmetrical, trachea midline, no JVD   Lungs:     Clear to auscultation bilaterally, respirations unlabored   Chest Wall:    No tenderness or deformity    Heart:    Regular rate and rhythm, S1 and S2 normal, no murmur,no  rub or gallop   Abdomen:     Soft, mild epigastric tenderness, bowel sounds active, no masses, no organomegaly    Extremities:   Extremities normal, atraumatic, no cyanosis or edema   Pulses:      Skin:   Skin is warm and dry,  no rashes or palpable lesions   Neurologic:   no focal deficits noted      Lab Results (last 72 hours)       Procedure Component Value Units Date/Time    Lipase [[id number]]  (Abnormal) Collected: 12/02/24 0700    Specimen: Blood Updated: 12/02/24 0805     Lipase 1,846 U/L     Comprehensive Metabolic Panel [[id number]]  (Abnormal) Collected: 12/02/24 0700    Specimen: Blood Updated: 12/02/24 0756     Glucose 120 mg/dL      BUN 9 mg/dL      Creatinine 0.85 mg/dL      Sodium 140 mmol/L      Potassium 3.3 mmol/L      Chloride 106 mmol/L      CO2 25.3 mmol/L      Calcium 8.5 mg/dL      Total Protein 6.1 g/dL      Albumin 3.3 g/dL      ALT (SGPT) 271 U/L      AST (SGOT) 357 U/L      Alkaline Phosphatase 190 U/L      Total Bilirubin 3.2 mg/dL      Globulin 2.8 gm/dL      A/G Ratio 1.2 g/dL      BUN/Creatinine Ratio 10.6     Anion Gap 8.7 mmol/L      eGFR 87.3 mL/min/1.73     Narrative:      " GFR Normal >60  Chronic Kidney Disease <60  Kidney Failure <15      CBC Auto Differential [[id number]]  (Normal) Collected: 12/02/24 0700    Specimen: Blood Updated: 12/02/24 0742     WBC 9.25 10*3/mm3      RBC 4.53 10*6/mm3      Hemoglobin 13.7 g/dL      Hematocrit 39.7 %      MCV 87.6 fL      MCH 30.2 pg      MCHC 34.5 g/dL      RDW 12.4 %      RDW-SD 39.6 fl      MPV 9.2 fL      Platelets 323 10*3/mm3      Neutrophil % 65.0 %      Lymphocyte % 27.0 %      Monocyte % 6.1 %      Eosinophil % 1.2 %      Basophil % 0.5 %      Immature Grans % 0.2 %      Neutrophils, Absolute 6.01 10*3/mm3      Lymphocytes, Absolute 2.50 10*3/mm3      Monocytes, Absolute 0.56 10*3/mm3      Eosinophils, Absolute 0.11 10*3/mm3      Basophils, Absolute 0.05 10*3/mm3      Immature Grans, Absolute 0.02 10*3/mm3      nRBC 0.0 /100 WBC     CBC & Differential [[id number]]  (Abnormal) Collected: 12/01/24 1205    Specimen: Blood Updated: 12/01/24 1208    Narrative:      The following orders were created for panel order CBC & Differential.  Procedure                               Abnormality         Status                     ---------                               -----------         ------                     CBC Auto Differential[[id number]]        Abnormal            Final result                 Please view results for these tests on the individual orders.    CBC Auto Differential [[id number]]  (Abnormal) Collected: 12/01/24 1205    Specimen: Blood Updated: 12/01/24 1208     WBC 10.01 10*3/mm3      RBC 4.94 10*6/mm3      Hemoglobin 14.9 g/dL      Hematocrit 45.3 %      MCV 91.7 fL      MCH 30.2 pg      MCHC 32.9 g/dL      RDW 12.3 %      RDW-SD 42.1 fl      MPV 10.0 fL      Platelets 340 10*3/mm3      Neutrophil % 79.7 %      Lymphocyte % 14.8 %      Monocyte % 4.9 %      Eosinophil % 0.1 %      Basophil % 0.2 %      Immature Grans % 0.3 %      Neutrophils, Absolute 7.98 10*3/mm3      Lymphocytes, Absolute 1.48 10*3/mm3      Monocytes, Absolute  "0.49 10*3/mm3      Eosinophils, Absolute 0.01 10*3/mm3      Basophils, Absolute 0.02 10*3/mm3      Immature Grans, Absolute 0.03 10*3/mm3     Comprehensive Metabolic Panel [[id number]]  (Abnormal) Collected: 12/01/24 1035    Specimen: Blood Updated: 12/01/24 1055     Glucose 120 mg/dL      BUN 10 mg/dL      Creatinine 0.87 mg/dL      Sodium 137 mmol/L      Potassium 3.5 mmol/L      Chloride 102 mmol/L      CO2 25.7 mmol/L      Calcium 9.1 mg/dL      Total Protein 6.8 g/dL      Albumin 3.7 g/dL      ALT (SGPT) 181 U/L      AST (SGOT) 212 U/L      Alkaline Phosphatase 177 U/L      Total Bilirubin 1.5 mg/dL      Globulin 3.1 gm/dL      A/G Ratio 1.2 g/dL      BUN/Creatinine Ratio 11.5     Anion Gap 9.3 mmol/L      eGFR 84.9 mL/min/1.73     Narrative:      GFR Normal >60  Chronic Kidney Disease <60  Kidney Failure <15      Lipase [[id number]]  (Abnormal) Collected: 12/01/24 1035    Specimen: Blood Updated: 12/01/24 1055     Lipase 2,527 U/L           Data Review:  Results from last 7 days   Lab Units 12/02/24  0700 12/01/24  1035   SODIUM mmol/L 140 137   POTASSIUM mmol/L 3.3* 3.5   CHLORIDE mmol/L 106 102   CO2 mmol/L 25.3 25.7   BUN mg/dL 9 10   CREATININE mg/dL 0.85 0.87   GLUCOSE mg/dL 120* 120*   CALCIUM mg/dL 8.5* 9.1     Results from last 7 days   Lab Units 12/02/24  0700 12/01/24  1205   WBC 10*3/mm3 9.25 10.01   HEMOGLOBIN g/dL 13.7 14.9   HEMATOCRIT % 39.7 45.3   PLATELETS 10*3/mm3 323 340             No results found for: \"TROPONINT\"      Results from last 7 days   Lab Units 12/02/24  0700 12/01/24  1035   ALK PHOS U/L 190* 177*   BILIRUBIN mg/dL 3.2* 1.5*   ALT (SGPT) U/L 271* 181*   AST (SGOT) U/L 357* 212*             No results found for: \"POCGLU\"        History reviewed. No pertinent past medical history.    Assessment:  Active Hospital Problems    Diagnosis  POA    **Pancreatitis [K85.90]  Yes    HTN (hypertension) [I10]  Unknown    Obesity, Class III, BMI 40-49.9 (morbid obesity) [E66.01]  Unknown    "   Resolved Hospital Problems   No resolved problems to display.       Plan:  Await MRI/MRCP of pancreas.  Continue with IV fluids with pain and nausea medicine.  Await recommendations from GI medicine.  May need ERCP.  Follow-up on labs.    Vitaly Lee MD  12/2/2024  13:52 EST     Electronically signed by Vitaly Lee MD at 12/02/24 1353          Consult Notes (last 72 hours)        Latonia Joaquin PA-C at 12/02/24 0850        Consult Orders    1. Inpatient Gastroenterology Consult [[id number]] ordered by Vitaly Lee MD at 12/01/24 1500              Attestation signed by Jesus Skelton MD at 12/02/24 1412    I have reviewed this documentation and agree.                  [facility]  Initial Inpatient Consult Note    Referring Provider: [facility]    Reason for Consultation: Abdominal pain    Subjective     History of present illness:    44 y.o. female with a past medical history of hypertension who presents to [facility] ED in the setting of abdominal pain.    Surgical history includes cholecystectomy.    Patient reports she has been experiencing pain in her epigastric region on and off for the last year.  Patient reports she had some worsening symptoms approximately 1 to 2 weeks ago prompting her to go to the ED.  She was recommended to take pantoprazole daily and Maalox up to 3 times a day and was discharged home.  Patient reports she had minimal improvement with this regimen.  Patient describes the pain as sharp and occasionally burning with radiation to her back.  She reports it worsened over the weekend prompting her to come to the emergency room.  She did experience some nausea, vomiting, diarrhea at this time.  Patient has been on Zepbound for approximately the last 6 weeks.  She denies any abnormal weight loss.  She denies any alcohol use.  Her last dose of Mounjaro was this past Friday.  She reports NSAID use sparingly.    CT abdomen pelvis without IV contrast reviewed with no  obvious loculated peripancreatic fluid but limited by lack of contrast.  Mild to moderate intra and extra-hepatic duct dilation S/P cholecystectomy.  Findings may be postsurgical but MRCP recommended.    Labs reviewed with elevated , , , T. bili 1.5, lipase 2527.  Normal white blood cell count.  Worsening LFTs this morning.    Patient does report her pain is improved at this morning.  No further nausea or vomiting.    Past Medical History:  History reviewed. No pertinent past medical history.  Past Surgical History:  Past Surgical History:   Procedure Laterality Date    CHOLECYSTECTOMY        Social History:   Social History     Tobacco Use    Smoking status: Never    Smokeless tobacco: Not on file   Substance Use Topics    Alcohol use: Not Currently      Family History:  History reviewed. No pertinent family history.    Home Meds:  Medications Prior to Admission   Medication Sig Dispense Refill Last Dose/Taking    aluminum-magnesium hydroxide-simethicone (MAALOX) 200-200-20 MG/5ML suspension Take 30 mL by mouth 4 (Four) Times a Day Before Meals & at Bedtime. 1680 mL 0 Taking    hydroCHLOROthiazide 12.5 MG tablet Take 1 tablet by mouth Daily.   Taking    losartan (COZAAR) 100 MG tablet Take 1 tablet by mouth Daily.   Taking    Norgestim-Eth Estrad Triphasic (TRI-SPRINTEC PO) Take  by mouth Daily.   Taking    pantoprazole (PROTONIX) 20 MG EC tablet Take 1 tablet by mouth Daily for 30 days. 30 tablet 0 Taking    Tirzepatide-Weight Management (Zepbound) 2.5 MG/0.5ML solution auto-injector Inject 0.5 mL under the skin into the appropriate area as directed 1 (One) Time Per Week. Takes on Friday   Taking     Current Meds:   pantoprazole, 40 mg, Oral, Daily  sodium chloride, 10 mL, Intravenous, Q12H      Allergies:  Allergies   Allergen Reactions    Penicillins Hives       Objective     Vital Signs  Temp:  [96.7 °F (35.9 °C)-98.7 °F (37.1 °C)] 97.4 °F (36.3 °C)  Heart Rate:  [65-99] 74  Resp:  [16-18]  16  BP: (119-152)/(73-99) 119/73  Physical Exam:  General Appearance:     Alert, cooperative, in no acute distress   Abdomen:     Normal bowel sounds, no masses, no organomegaly, soft     nontender, nondistended, no guarding, no rebound                 tenderness   Rectal:     Deferred       Results Review:   I reviewed the patient's new clinical results.  I reviewed the patient's new imaging results and agree with the interpretation.    Results from last 7 days   Lab Units 12/02/24  0700 12/01/24  1205   WBC 10*3/mm3 9.25 10.01   HEMOGLOBIN g/dL 13.7 14.9   HEMATOCRIT % 39.7 45.3   PLATELETS 10*3/mm3 323 340     Results from last 7 days   Lab Units 12/02/24  0700 12/01/24  1035   SODIUM mmol/L 140 137   POTASSIUM mmol/L 3.3* 3.5   CHLORIDE mmol/L 106 102   CO2 mmol/L 25.3 25.7   BUN mg/dL 9 10   CREATININE mg/dL 0.85 0.87   CALCIUM mg/dL 8.5* 9.1   BILIRUBIN mg/dL 3.2* 1.5*   ALK PHOS U/L 190* 177*   ALT (SGPT) U/L 271* 181*   AST (SGOT) U/L 357* 212*   GLUCOSE mg/dL 120* 120*         Lab Results   Lab Value Date/Time    LIPASE 1,846 (H) 12/02/2024 0700    LIPASE 2,527 (H) 12/01/2024 1035       Radiology:  CT Abdomen Pelvis Without Contrast   Final Result   1.  No measurable loculated peripancreatic fluid collection is seen.   However, please note evaluation is suboptimal without intravenous   contrast. Mild to moderate intra and extra medical duct dilation status   post cholecystectomy. While findings may be post surgical, they remain   indeterminant given patient's elevated liver enzymes and lipase levels   and biliary obstruction cannot be excluded. Further evaluation with   abdominal MRI and MRCP should be considered if there is clinical concern   for biliary obstruction..   2.  If this patient is at increased risk for lung cancer, follow-up   chest CT in 12 months can be considered to ensure stability. If this   patient is not at increased risk for lung cancer, no further follow-up   is necessary per  Fleischner criteria.   3.  Other findings as above               This report was finalized on 12/1/2024 11:48 AM by Dr. Deni Marie M.D on Workstation: [id number]          MRI abdomen w wo contrast mrcp    (Results Pending)       Assessment & Plan   Assessment:   Epigastric abdominal pain  Elevated lipase - findings consistent with acute pancreatitis   Elevated LFTs   CT with mild to moderate intra and extra hepatic ductal dilation s/p CCY   Mounjaro therapy     Plan:   Continue supportive care   Await MRCP to r/o biliary obstruction  Likely biliary etiology but medication induced pancreatitis is a possibility - patient is on both mounjaro and HCTZ   NPO for now    I discussed the patients findings and my recommendations with patient.    Dictated utilizing Dragon dictation.         Latonia Joaquin PA-C   [facility]  [address] [place] [address]  Office: [phone]       Electronically signed by Jesus Skelton MD at 12/02/24 7766

## 2024-12-04 NOTE — H&P
Patient Care Team:  Pretty Dean MD as PCP - General (Family Medicine)      Subjective .     History of present illness:    Racquel Degroot is a 44 y.o. female who presents today for Procedure(s):  ENDOSCOPIC RETROGRADE CHOLANGIOPANCREATOGRAPHY with sphincterotomy and balloon sweep with stone removal for the indications listed below.     The updated Patient Profile was reviewed prior to the procedure, in conjunction with the Physical Exam, including medical conditions, surgical procedures, medications, allergies, family history and social history.     Pre-operatively, I reviewed the indication(s) for the procedure, the risks of the procedure [including but not limited to: unexpected bleeding possibly requiring hospitalization and/or unplanned repeat procedures, perforation possibly requiring surgical treatment, missed lesions and complications of sedation/MAC (also explained by anesthesia staff)].     I have evaluated the patient for risks associated with the planned anesthesia and the procedure to be performed and find the patient an acceptable candidate for IV sedation.    Multiple opportunities were provided for any questions or concerns, and all questions were answered satisfactorily before any anesthesia was administered. We will proceed with the planned procedure.      ASSESSMENT/PLAN:  44 years old female who has acute biliary pancreatitis with progressively increasing liver function test questionable small stone at the ampulla due to progressively increasing bilirubin and LFTs ERCP is being performed for the stone.  Procedure discussed in detail with the patient and family after showing them the picture and drawing the picture for the whole procedure.  Risk of procedure including perforation bleeding infection post ERCP pancreatitis were explained in detail requiring prolonged hospital stay.      Past Medical History:  History reviewed. No pertinent past medical history.    Past Surgical  History:  Past Surgical History:   Procedure Laterality Date    CHOLECYSTECTOMY         Social History:  Social History     Tobacco Use    Smoking status: Former     Types: Cigarettes     Start date:      Quit date:      Years since quittin.9     Passive exposure: Current    Smokeless tobacco: Never   Vaping Use    Vaping status: Never Used   Substance Use Topics    Alcohol use: Not Currently    Drug use: Never       Family History:  History reviewed. No pertinent family history.    Medications:  Medications Prior to Admission   Medication Sig Dispense Refill Last Dose/Taking    aluminum-magnesium hydroxide-simethicone (MAALOX) 200-200-20 MG/5ML suspension Take 30 mL by mouth 4 (Four) Times a Day Before Meals & at Bedtime. 1680 mL 0 Taking    hydroCHLOROthiazide 12.5 MG tablet Take 1 tablet by mouth Daily.   Taking    losartan (COZAAR) 100 MG tablet Take 1 tablet by mouth Daily.   Taking    Norgestim-Eth Estrad Triphasic (TRI-SPRINTEC PO) Take  by mouth Daily.   Taking    pantoprazole (PROTONIX) 20 MG EC tablet Take 1 tablet by mouth Daily for 30 days. 30 tablet 0 Taking    Tirzepatide-Weight Management (Zepbound) 2.5 MG/0.5ML solution auto-injector Inject 0.5 mL under the skin into the appropriate area as directed 1 (One) Time Per Week. Takes on Friday   Taking       Scheduled Meds:pantoprazole, 40 mg, Oral, Daily  sodium chloride, 10 mL, Intravenous, Q12H      Continuous Infusions:sodium chloride, 1,000 mL, Last Rate: 1,000 mL (24 1636)  sodium chloride, 100 mL/hr, Last Rate: 100 mL/hr (24 0832)      PRN Meds:.  acetaminophen **OR** acetaminophen **OR** acetaminophen    senna-docusate sodium **AND** polyethylene glycol **AND** bisacodyl **AND** bisacodyl    HYDROmorphone **AND** naloxone    lidocaine    melatonin    ondansetron ODT **OR** ondansetron    prochlorperazine    sodium chloride    sodium chloride    sodium chloride    ALLERGIES:  Penicillins        Objective     Vital Signs:    Temp:  [97.1 °F (36.2 °C)-98.3 °F (36.8 °C)] 97.3 °F (36.3 °C)  Heart Rate:  [69-82] 73  Resp:  [16-20] 20  BP: (128-152)/(73-88) 152/75    Physical Exam:      General Appearance:    Awake and alert, in no acute distress   Lungs:     Clear to auscultation bilaterally, respirations regular, even and unlabored    Heart:    Regular rhythm and normal rate, normal S1 and S2, no            murmur, no gallop, no rub   Abdomen:     Normal bowel sounds, soft, non-tender, no rebound or guarding, non-distended, no hepatosplenomegaly        Results Review:   I reviewed the patient's labs and imaging.  Lab Results (last 24 hours)       Procedure Component Value Units Date/Time    Hepatic Function Panel [[id number]]  (Abnormal) Collected: 12/04/24 1510    Specimen: Blood Updated: 12/04/24 1606     Total Protein 6.7 g/dL      Albumin 3.5 g/dL      ALT (SGPT) 272 U/L      AST (SGOT) 178 U/L      Alkaline Phosphatase 249 U/L      Total Bilirubin 2.1 mg/dL      Bilirubin, Direct 1.2 mg/dL      Bilirubin, Indirect 0.9 mg/dL     Lipase [[id number]]  (Abnormal) Collected: 12/04/24 0542    Specimen: Blood Updated: 12/04/24 0643     Lipase 504 U/L     Comprehensive Metabolic Panel [[id number]]  (Abnormal) Collected: 12/04/24 0542    Specimen: Blood Updated: 12/04/24 0638     Glucose 94 mg/dL      BUN 8 mg/dL      Creatinine 0.80 mg/dL      Sodium 142 mmol/L      Potassium 3.8 mmol/L      Chloride 107 mmol/L      CO2 21.0 mmol/L      Calcium 8.4 mg/dL      Total Protein 6.2 g/dL      Albumin 3.3 g/dL      ALT (SGPT) 290 U/L      AST (SGOT) 242 U/L      Alkaline Phosphatase 247 U/L      Total Bilirubin 5.8 mg/dL      Globulin 2.9 gm/dL      A/G Ratio 1.1 g/dL      BUN/Creatinine Ratio 10.0     Anion Gap 14.0 mmol/L      eGFR 93.9 mL/min/1.73     Narrative:      GFR Normal >60  Chronic Kidney Disease <60  Kidney Failure <15      CBC & Differential [[id number]]  (Abnormal) Collected: 12/04/24 0542    Specimen: Blood Updated: 12/04/24 0613     Narrative:      The following orders were created for panel order CBC & Differential.  Procedure                               Abnormality         Status                     ---------                               -----------         ------                     CBC Auto Differential[[id number]]        Abnormal            Final result                 Please view results for these tests on the individual orders.    CBC Auto Differential [[id number]]  (Abnormal) Collected: 12/04/24 0542    Specimen: Blood Updated: 12/04/24 0613     WBC 9.18 10*3/mm3      RBC 4.48 10*6/mm3      Hemoglobin 13.1 g/dL      Hematocrit 40.1 %      MCV 89.5 fL      MCH 29.2 pg      MCHC 32.7 g/dL      RDW 12.5 %      RDW-SD 40.8 fl      MPV 9.3 fL      Platelets 328 10*3/mm3      Neutrophil % 68.1 %      Lymphocyte % 24.3 %      Monocyte % 4.9 %      Eosinophil % 1.2 %      Basophil % 0.7 %      Immature Grans % 0.8 %      Neutrophils, Absolute 6.26 10*3/mm3      Lymphocytes, Absolute 2.23 10*3/mm3      Monocytes, Absolute 0.45 10*3/mm3      Eosinophils, Absolute 0.11 10*3/mm3      Basophils, Absolute 0.06 10*3/mm3      Immature Grans, Absolute 0.07 10*3/mm3      nRBC 0.0 /100 WBC             Imaging Results (Last 24 Hours)       ** No results found for the last 24 hours. **               I discussed the patients findings and my recommendations with the patient.  Kristi Pedroza MD  12/04/24  18:04 EST

## 2024-12-05 LAB
ALBUMIN SERPL-MCNC: 2.8 G/DL (ref 3.5–5.2)
ALBUMIN/GLOB SERPL: 1 G/DL
ALP SERPL-CCNC: 234 U/L (ref 39–117)
ALT SERPL W P-5'-P-CCNC: 224 U/L (ref 1–33)
ANION GAP SERPL CALCULATED.3IONS-SCNC: 9.4 MMOL/L (ref 5–15)
AST SERPL-CCNC: 177 U/L (ref 1–32)
BASOPHILS # BLD AUTO: 0.06 10*3/MM3 (ref 0–0.2)
BASOPHILS NFR BLD AUTO: 0.7 % (ref 0–1.5)
BILIRUB SERPL-MCNC: 3.7 MG/DL (ref 0–1.2)
BUN SERPL-MCNC: 6 MG/DL (ref 6–20)
BUN/CREAT SERPL: 9.7 (ref 7–25)
CALCIUM SPEC-SCNC: 7.7 MG/DL (ref 8.6–10.5)
CHLORIDE SERPL-SCNC: 108 MMOL/L (ref 98–107)
CO2 SERPL-SCNC: 20.6 MMOL/L (ref 22–29)
CREAT SERPL-MCNC: 0.62 MG/DL (ref 0.57–1)
DEPRECATED RDW RBC AUTO: 40.6 FL (ref 37–54)
EGFRCR SERPLBLD CKD-EPI 2021: 113.5 ML/MIN/1.73
EOSINOPHIL # BLD AUTO: 0.19 10*3/MM3 (ref 0–0.4)
EOSINOPHIL NFR BLD AUTO: 2.3 % (ref 0.3–6.2)
ERYTHROCYTE [DISTWIDTH] IN BLOOD BY AUTOMATED COUNT: 12.4 % (ref 12.3–15.4)
GLOBULIN UR ELPH-MCNC: 2.9 GM/DL
GLUCOSE SERPL-MCNC: 89 MG/DL (ref 65–99)
HCT VFR BLD AUTO: 35.6 % (ref 34–46.6)
HGB BLD-MCNC: 12.2 G/DL (ref 12–15.9)
IMM GRANULOCYTES # BLD AUTO: 0.06 10*3/MM3 (ref 0–0.05)
IMM GRANULOCYTES NFR BLD AUTO: 0.7 % (ref 0–0.5)
LYMPHOCYTES # BLD AUTO: 2.54 10*3/MM3 (ref 0.7–3.1)
LYMPHOCYTES NFR BLD AUTO: 31.1 % (ref 19.6–45.3)
MCH RBC QN AUTO: 30.6 PG (ref 26.6–33)
MCHC RBC AUTO-ENTMCNC: 34.3 G/DL (ref 31.5–35.7)
MCV RBC AUTO: 89.2 FL (ref 79–97)
MONOCYTES # BLD AUTO: 0.49 10*3/MM3 (ref 0.1–0.9)
MONOCYTES NFR BLD AUTO: 6 % (ref 5–12)
NEUTROPHILS NFR BLD AUTO: 4.82 10*3/MM3 (ref 1.7–7)
NEUTROPHILS NFR BLD AUTO: 59.2 % (ref 42.7–76)
NRBC BLD AUTO-RTO: 0 /100 WBC (ref 0–0.2)
PLATELET # BLD AUTO: 281 10*3/MM3 (ref 140–450)
PMV BLD AUTO: 9.3 FL (ref 6–12)
POTASSIUM SERPL-SCNC: 3.3 MMOL/L (ref 3.5–5.2)
PROT SERPL-MCNC: 5.7 G/DL (ref 6–8.5)
RBC # BLD AUTO: 3.99 10*6/MM3 (ref 3.77–5.28)
SODIUM SERPL-SCNC: 138 MMOL/L (ref 136–145)
WBC NRBC COR # BLD AUTO: 8.16 10*3/MM3 (ref 3.4–10.8)

## 2024-12-05 PROCEDURE — 85025 COMPLETE CBC W/AUTO DIFF WBC: CPT | Performed by: INTERNAL MEDICINE

## 2024-12-05 PROCEDURE — 25810000003 SODIUM CHLORIDE 0.9 % SOLUTION: Performed by: INTERNAL MEDICINE

## 2024-12-05 PROCEDURE — 80053 COMPREHEN METABOLIC PANEL: CPT | Performed by: INTERNAL MEDICINE

## 2024-12-05 RX ORDER — POTASSIUM CHLORIDE 750 MG/1
40 TABLET, EXTENDED RELEASE ORAL ONCE
Status: COMPLETED | OUTPATIENT
Start: 2024-12-05 | End: 2024-12-05

## 2024-12-05 RX ADMIN — SODIUM CHLORIDE 100 ML/HR: 9 INJECTION, SOLUTION INTRAVENOUS at 12:09

## 2024-12-05 RX ADMIN — LOSARTAN POTASSIUM 100 MG: 100 TABLET, FILM COATED ORAL at 17:51

## 2024-12-05 RX ADMIN — PANTOPRAZOLE SODIUM 40 MG: 40 INJECTION, POWDER, FOR SOLUTION INTRAVENOUS at 05:55

## 2024-12-05 RX ADMIN — POTASSIUM CHLORIDE 40 MEQ: 750 TABLET, EXTENDED RELEASE ORAL at 14:15

## 2024-12-05 RX ADMIN — SODIUM CHLORIDE 100 ML/HR: 9 INJECTION, SOLUTION INTRAVENOUS at 22:40

## 2024-12-05 RX ADMIN — Medication 10 ML: at 12:10

## 2024-12-05 NOTE — PLAN OF CARE
Goal Outcome Evaluation:  Plan of Care Reviewed With: patient        Progress: improving  Outcome Evaluation: vss, no c/o pain, voiding freely, tolerating clear liquid diet, BP med losartan given last night, up ad jessica, continue to monitor the pt.

## 2024-12-05 NOTE — PLAN OF CARE
Goal Outcome Evaluation:              Outcome Evaluation: VSS. IV fluids infusing. Up ad jessica. No pain. On Full Liquid diet now. K+: 3.3- one time dose given.

## 2024-12-05 NOTE — ANESTHESIA POSTPROCEDURE EVALUATION
"Patient: Racquel Degroot    Procedure Summary       Date: 12/04/24 [facility]  [facility]    Anesthesia Start: 1735 Anesthesia Stop: 1821    Procedure: ENDOSCOPIC RETROGRADE CHOLANGIOPANCREATOGRAPHY with sphincterotomy and balloon sweep with stone removal Diagnosis:     Surgeons: Kristi Pedroza MD Provider: Blake Yang MD    Anesthesia Type: general ASA Status: 3            Anesthesia Type: general    Vitals  Vitals Value Taken Time   /83 12/04/24 1837   Temp     Pulse 92 12/04/24 1837   Resp 16 12/04/24 1837   SpO2 94 % 12/04/24 1837           Post Anesthesia Care and Evaluation    Patient location during evaluation: bedside  Patient participation: complete - patient participated  Level of consciousness: sleepy but conscious  Pain score: 0  Pain management: adequate    Airway patency: patent  Anesthetic complications: No anesthetic complications    Cardiovascular status: acceptable  Respiratory status: acceptable  Hydration status: acceptable    Comments: BP (!) 166/105 (BP Location: Right arm, Patient Position: Sitting)   Pulse 75   Temp 36 °C (96.8 °F)   Resp 16   Ht 164.6 cm (64.8\")   Wt 127 kg (280 lb 13.9 oz)   LMP 10/24/2024   SpO2 97%   BMI 47.03 kg/m²       "

## 2024-12-05 NOTE — PROGRESS NOTES
Continued Stay Note  [facility]     Patient Name: Racquel Degroot  MRN: [id number]  Today's Date: 12/5/2024    Admit Date: 12/1/2024    Plan: Home no needs   Discharge Plan       Row Name 12/05/24 1343       Plan    Plan Home no needs    Plan Comments CCP is continuing to follow for poss needs/equipment. DC plan is home                   Discharge Codes    No documentation.                 Expected Discharge Date and Time       Expected Discharge Date Expected Discharge Time    Dec 6, 2024               Jadyn Mahoney RN

## 2024-12-05 NOTE — PLAN OF CARE
Goal Outcome Evaluation:  Plan of Care Reviewed With: patient        Progress: improving  Outcome Evaluation: Pt. has denied c/o pain throughout this shift. No nausea. States she is just hungry. Pt. went to OR for an ERCP this afternoon. A stone and gastritis were found. Pt. returned to room hypertensive. Dr. Villegas answerring service notofied. Simran called back stating she would put in orders. IVF'S infusing at 100 cc/hr.Oncoming nurse notified of elevated b/p with newmed order.

## 2024-12-05 NOTE — PROGRESS NOTES
Name: Racquel Degroot ADMIT: 2024   : 1980  PCP: Pretty Dean MD    MRN: [id number] LOS: 4 days   AGE/SEX: 44 y.o. female  ROOM: [facility]     Subjective   Subjective   Sitting up in recliner, tolerating broth yesterday and this morning for breakfast.  Eager to try advancing her diet.       Objective   Objective   Vital Signs  Temp:  [96.8 °F (36 °C)-98.7 °F (37.1 °C)] 97.6 °F (36.4 °C)  Heart Rate:  [65-92] 65  Resp:  [16-20] 16  BP: (132-177)/() 141/75  SpO2:  [94 %-98 %] 96 %  on  Flow (L/min) (Oxygen Therapy):  [3-6] 3;   Device (Oxygen Therapy): room air  Body mass index is 47.03 kg/m².  Physical Exam  Constitutional:       General: She is not in acute distress.     Appearance: Normal appearance. She is obese. She is not ill-appearing.   Cardiovascular:      Rate and Rhythm: Normal rate and regular rhythm.   Pulmonary:      Effort: Pulmonary effort is normal. No respiratory distress.      Breath sounds: Normal breath sounds. No wheezing.   Abdominal:      General: Abdomen is flat. Bowel sounds are normal.      Palpations: Abdomen is soft.      Tenderness: There is no abdominal tenderness.   Musculoskeletal:         General: No swelling or tenderness.      Right lower leg: No edema.      Left lower leg: No edema.   Skin:     General: Skin is warm and dry.   Neurological:      General: No focal deficit present.      Mental Status: She is alert and oriented to person, place, and time. Mental status is at baseline.         Results Review     I reviewed the patient's new clinical results.  Results from last 7 days   Lab Units 24  03124  0542 24  0613 24  0700   WBC 10*3/mm3 8.16 9.18 8.57 9.25   HEMOGLOBIN g/dL 12.2 13.1 12.8 13.7   PLATELETS 10*3/mm3 281 328 302 323     Results from last 7 days   Lab Units 24  03124  0542 24  0613 24  0700   SODIUM mmol/L 138 142 142 140   POTASSIUM mmol/L 3.3* 3.8 3.4* 3.3*   CHLORIDE mmol/L 108* 107  "109* 106   CO2 mmol/L 20.6* 21.0* 22.0 25.3   BUN mg/dL 6 8 9 9   CREATININE mg/dL 0.62 0.80 0.84 0.85   GLUCOSE mg/dL 89 94 96 120*   Estimated Creatinine Clearance: 156.4 mL/min (by C-G formula based on SCr of 0.62 mg/dL).  Results from last 7 days   Lab Units 12/05/24  0310 12/04/24  1510 12/04/24  0542 12/03/24  0613   ALBUMIN g/dL 2.8* 3.5 3.3* 2.7*   BILIRUBIN mg/dL 3.7* 2.1* 5.8* 4.1*  4.2*   ALK PHOS U/L 234* 249* 247* 214*   AST (SGOT) U/L 177* 178* 242* 307*   ALT (SGPT) U/L 224* 272* 290* 304*     Results from last 7 days   Lab Units 12/05/24  0310 12/04/24  1510 12/04/24  0542 12/03/24  0613 12/02/24  0700   CALCIUM mg/dL 7.7*  --  8.4* 8.0* 8.5*   ALBUMIN g/dL 2.8* 3.5 3.3* 2.7* 3.3*     No results found for: \"COVID19\"  No results found for: \"HGBA1C\", \"POCGLU\"    FL ercp biliary duct only  Narrative: FL ERCP BILIARY DUCT ONLY-     Impression: 6 spot fluoroscopic images from ERCP. Please see procedure  note for further details. Fluoroscopy time 1 minute 28 seconds.  Reference air kerma 14 mGy.     This report was finalized on 12/4/2024 7:47 PM by Dr. Prem Flores M.D on Workstation: [id number]       Scheduled Medications  losartan, 100 mg, Oral, Daily  pantoprazole, 40 mg, Intravenous, Q AM  sodium chloride, 10 mL, Intravenous, Q12H    Infusions  sodium chloride, 100 mL/hr, Last Rate: 100 mL/hr (12/04/24 2137)    Diet  Diet: Liquid; Clear Liquid; Fluid Consistency: Thin (IDDSI 0)         I have personally reviewed:  [x]  Laboratory   []  Microbiology   []  Radiology   [x]  EKG/Telemetry   []  Cardiology/Vascular   []  Pathology   []  Records    Assessment/Plan     Active Hospital Problems    Diagnosis  POA    **Pancreatitis [K85.90]  Yes    HTN (hypertension) [I10]  Unknown    Obesity, Class III, BMI 40-49.9 (morbid obesity) [E66.01]  Unknown      Resolved Hospital Problems   No resolved problems to display.       44 y.o. female admitted with Pancreatitis.    Gallstone pancreatitis  Elevated " LFTs  -Patient had her gallbladder removed approximately 10 years ago, s/p ERCP with sphincterotomy on 12/4, tolerating clear liquid diet, advance to full liquid diet today, she is hopeful for discharge home in the next day or 2  -Anticipate LFTs will improve over the next day or so    Hypokalemia  -replace with p.o. potassium    Hypertension  - cont losartan    Obesity- BMI 47    SCDs for DVT prophylaxis.  Full code.  Discussed with patient and nursing staff.  Anticipated discharge home, 1-2 days        Lorena Oropeza MD  [facility]  12/05/24  07:59 EST    I wore protective equipment throughout this patient encounter including gloves.  Hand hygiene was performed before donning protective equipment and after removal when leaving the room.    Expected Discharge Date and Time       Expected Discharge Date Expected Discharge Time    Dec 5, 2024              Copied text in this note has been reviewed and is accurate as of 12/05/24.

## 2024-12-06 ENCOUNTER — READMISSION MANAGEMENT (OUTPATIENT)
Dept: CALL CENTER | Facility: HOSPITAL | Age: 44
End: 2024-12-06
Payer: COMMERCIAL

## 2024-12-06 VITALS
WEIGHT: 280.87 LBS | SYSTOLIC BLOOD PRESSURE: 160 MMHG | TEMPERATURE: 97.5 F | HEART RATE: 68 BPM | BODY MASS INDEX: 46.8 KG/M2 | RESPIRATION RATE: 18 BRPM | DIASTOLIC BLOOD PRESSURE: 93 MMHG | HEIGHT: 65 IN | OXYGEN SATURATION: 94 %

## 2024-12-06 LAB
ALBUMIN SERPL-MCNC: 2.9 G/DL (ref 3.5–5.2)
ALBUMIN/GLOB SERPL: 1 G/DL
ALP SERPL-CCNC: 193 U/L (ref 39–117)
ALT SERPL W P-5'-P-CCNC: 201 U/L (ref 1–33)
ANION GAP SERPL CALCULATED.3IONS-SCNC: 9 MMOL/L (ref 5–15)
AST SERPL-CCNC: 136 U/L (ref 1–32)
BASOPHILS # BLD AUTO: 0.06 10*3/MM3 (ref 0–0.2)
BASOPHILS NFR BLD AUTO: 0.8 % (ref 0–1.5)
BILIRUB SERPL-MCNC: 1.4 MG/DL (ref 0–1.2)
BUN SERPL-MCNC: 6 MG/DL (ref 6–20)
BUN/CREAT SERPL: 8.3 (ref 7–25)
CALCIUM SPEC-SCNC: 8.1 MG/DL (ref 8.6–10.5)
CHLORIDE SERPL-SCNC: 109 MMOL/L (ref 98–107)
CO2 SERPL-SCNC: 21 MMOL/L (ref 22–29)
CREAT SERPL-MCNC: 0.72 MG/DL (ref 0.57–1)
DEPRECATED RDW RBC AUTO: 40.4 FL (ref 37–54)
EGFRCR SERPLBLD CKD-EPI 2021: 106.5 ML/MIN/1.73
EOSINOPHIL # BLD AUTO: 0.3 10*3/MM3 (ref 0–0.4)
EOSINOPHIL NFR BLD AUTO: 3.9 % (ref 0.3–6.2)
ERYTHROCYTE [DISTWIDTH] IN BLOOD BY AUTOMATED COUNT: 12.4 % (ref 12.3–15.4)
GLOBULIN UR ELPH-MCNC: 2.9 GM/DL
GLUCOSE SERPL-MCNC: 88 MG/DL (ref 65–99)
HCT VFR BLD AUTO: 36.9 % (ref 34–46.6)
HGB BLD-MCNC: 12.6 G/DL (ref 12–15.9)
IMM GRANULOCYTES # BLD AUTO: 0.08 10*3/MM3 (ref 0–0.05)
IMM GRANULOCYTES NFR BLD AUTO: 1 % (ref 0–0.5)
LYMPHOCYTES # BLD AUTO: 2.78 10*3/MM3 (ref 0.7–3.1)
LYMPHOCYTES NFR BLD AUTO: 36 % (ref 19.6–45.3)
MCH RBC QN AUTO: 30.4 PG (ref 26.6–33)
MCHC RBC AUTO-ENTMCNC: 34.1 G/DL (ref 31.5–35.7)
MCV RBC AUTO: 89.1 FL (ref 79–97)
MONOCYTES # BLD AUTO: 0.46 10*3/MM3 (ref 0.1–0.9)
MONOCYTES NFR BLD AUTO: 6 % (ref 5–12)
NEUTROPHILS NFR BLD AUTO: 4.04 10*3/MM3 (ref 1.7–7)
NEUTROPHILS NFR BLD AUTO: 52.3 % (ref 42.7–76)
NRBC BLD AUTO-RTO: 0 /100 WBC (ref 0–0.2)
PLATELET # BLD AUTO: 314 10*3/MM3 (ref 140–450)
PMV BLD AUTO: 9.2 FL (ref 6–12)
POTASSIUM SERPL-SCNC: 3.4 MMOL/L (ref 3.5–5.2)
PROT SERPL-MCNC: 5.8 G/DL (ref 6–8.5)
RBC # BLD AUTO: 4.14 10*6/MM3 (ref 3.77–5.28)
SODIUM SERPL-SCNC: 139 MMOL/L (ref 136–145)
WBC NRBC COR # BLD AUTO: 7.72 10*3/MM3 (ref 3.4–10.8)

## 2024-12-06 PROCEDURE — 80053 COMPREHEN METABOLIC PANEL: CPT | Performed by: INTERNAL MEDICINE

## 2024-12-06 PROCEDURE — 85025 COMPLETE CBC W/AUTO DIFF WBC: CPT | Performed by: INTERNAL MEDICINE

## 2024-12-06 PROCEDURE — 25810000003 SODIUM CHLORIDE 0.9 % SOLUTION: Performed by: INTERNAL MEDICINE

## 2024-12-06 RX ORDER — PANTOPRAZOLE SODIUM 40 MG/1
40 TABLET, DELAYED RELEASE ORAL DAILY
Qty: 30 TABLET | Refills: 0 | Status: SHIPPED | OUTPATIENT
Start: 2024-12-06

## 2024-12-06 RX ADMIN — SODIUM CHLORIDE 100 ML/HR: 9 INJECTION, SOLUTION INTRAVENOUS at 08:39

## 2024-12-06 RX ADMIN — PANTOPRAZOLE SODIUM 40 MG: 40 INJECTION, POWDER, FOR SOLUTION INTRAVENOUS at 06:31

## 2024-12-06 RX ADMIN — ACETAMINOPHEN 650 MG: 325 TABLET ORAL at 06:31

## 2024-12-06 RX ADMIN — LOSARTAN POTASSIUM 100 MG: 100 TABLET, FILM COATED ORAL at 08:53

## 2024-12-06 NOTE — DISCHARGE SUMMARY
Patient Name: Racquel Degroot  : 1980  MRN: [id number]    Date of Admission: 2024  Date of Discharge:  2024  Primary Care Physician: Pretty Dean MD      Chief Complaint:   Abdominal Pain      Discharge Diagnoses     Active Hospital Problems    Diagnosis  POA    **Pancreatitis [K85.90]  Yes    HTN (hypertension) [I10]  Unknown    Obesity, Class III, BMI 40-49.9 (morbid obesity) [E66.01]  Unknown      Resolved Hospital Problems   No resolved problems to display.        Hospital Course     Ms. Degroot is a 44 y.o. female with a history of hypertension and previous cholecystectomy who presented to [facility] initially complaining of abdominal pain.  Please see the admitting history and physical for further details.  She was found to have pancreatitis and was admitted to the hospital for further evaluation and treatment.  The patient was treated with bowel rest, IV fluids, pain medications.  And MRCP did show defect in the ampulla and GI was consulted and took the patient for an ERCP which did reveal retained stone.  The patient underwent sphincterotomy and clearance of common bile duct on 2024.  She was also noted to have gastritis during EGD portion of procedure.  GI is recommending that she discharged on Protonix and follow-up with them in clinic.  She was tolerating a regular diet on the day of discharge.  Patient also follow-up with the PCP in a week for posthospital follow visit      Day of Discharge     Subjective:  Up and ambulating in the hallway.  Very ready to leave the hospital.  No significant pain.  Nausea and vomiting gone.    Physical Exam:  Temp:  [97.5 °F (36.4 °C)-98 °F (36.7 °C)] 97.5 °F (36.4 °C)  Heart Rate:  [68-71] 68  Resp:  [16-18] 18  BP: (137-160)/(80-96) 160/93  Body mass index is 47.03 kg/m².  Physical Exam  Constitutional:       General: She is not in acute distress.     Appearance: Normal appearance. She is obese. She is not  ill-appearing.   Cardiovascular:      Rate and Rhythm: Normal rate and regular rhythm.   Pulmonary:      Effort: Pulmonary effort is normal. No respiratory distress.      Breath sounds: Normal breath sounds. No wheezing.   Abdominal:      General: Abdomen is flat. Bowel sounds are normal.      Palpations: Abdomen is soft.      Tenderness: There is no abdominal tenderness.   Musculoskeletal:         General: No swelling or tenderness.      Right lower leg: No edema.      Left lower leg: No edema.   Skin:     General: Skin is warm and dry.   Neurological:      General: No focal deficit present.      Mental Status: She is alert and oriented to person, place, and time. Mental status is at baseline.         Consultants     Consult Orders (all) (From admission, onward)       Start     Ordered    12/04/24 1458  Inpatient Gastroenterology Consult  Once        Specialty:  Gastroenterology  Provider:  Jesus Almanza MD    12/04/24 1501    12/01/24 1458  Inpatient Gastroenterology Consult  Once        Specialty:  Gastroenterology  Provider:  Jesus Almanza MD    12/01/24 1500                  Procedures     Imaging Results (All)       Procedure Component Value Units Date/Time    FL ercp biliary duct only [[id number]] Collected: 12/04/24 1946     Updated: 12/04/24 1950    Narrative:      FL ERCP BILIARY DUCT ONLY-       Impression:      6 spot fluoroscopic images from ERCP. Please see procedure  note for further details. Fluoroscopy time 1 minute 28 seconds.  Reference air kerma 14 mGy.     This report was finalized on 12/4/2024 7:47 PM by Dr. Prem Flores M.D on Workstation: [id number]       MRI abdomen w wo contrast mrcp [[id number]] Collected: 12/02/24 1635     Updated: 12/02/24 1700    Narrative:      MRI ABDOMEN WITH AND WITHOUT CONTRAST     HISTORY: Pancreatitis with biliary duct dilation.     TECHNIQUE: Multiplanar multisequence MRI of the abdomen was performed  before and after the administration of IV Multihance.       COMPARISON: CT abdomen pelvis 12/1/2024.     FINDINGS: Preserved pancreas parenchymal volume. Nondilated main  pancreatic duct. Conventional main pancreatic ductal anatomy. Mildly  decreased intrinsic T1 hyperintense signal in the pancreatic head  (series 12/image 37). Peripancreatic T2 hyperintense signal/edema and  small amount of nonorganized free fluid adjacent to the pancreatic head  (series 7/image 22). Pancreas parenchyma enhances in its entirety. No  organized pancreatic or peripancreatic fluid collection. No focal  pancreatic lesion. Superimposed pancreatitis does partially limit  evaluation for underlying lesion. Patent regional vasculature without  surrounding soft tissue or appreciable pseudoaneurysm. Evaluation for  pseudoaneurysm is limited via MRI. No abdominal lymphadenopathy.     Cholecystectomy. Mildly dilated biliary tree with the common duct  measuring up to 1.1 cm (series 9/image 9). Common bile duct remains  dilated to the level of the ampulla. No evidence of choledocholithiasis.  No abnormal enhancement or soft tissue prominence at the level of the  ampulla.     Noncirrhotic liver morphology. No significant steatosis or iron  overload. Conventional hepatic arterial anatomy. Patent hepatic and  portal veins. No focal hepatic lesion.     Normal spleen, adrenal glands, kidneys and visualized bowel. No  enhancing osseous lesion.             Impression:      1. Acute interstitial edematous pancreatitis centered at the pancreatic  head. No organized pancreatic or peripancreatic fluid collection.  2. Post cholecystectomy. Mildly dilated biliary tree. No evidence of  choledocholithiasis. Biliary duct dilation is favored secondary to  reservoir effect post cholecystectomy.        This report was finalized on 12/2/2024 4:57 PM by Dr. Prem Flores M.D on Workstation: [id number]       CT Abdomen Pelvis Without Contrast [[id number]] Collected: 12/01/24 1137     Updated: 12/01/24 1151     Narrative:      CT ABDOMEN AND PELVIS WITHOUT IV CONTRAST     HISTORY: Abdominal pain     TECHNIQUE: Radiation dose reduction techniques were utilized, including  automated exposure control and exposure modulation based on body size.   3 mm images were obtained through the abdomen and pelvis without IV  contrast.     COMPARISON: None     FINDINGS:  Sub-6 mm pulm nodule in the right middle lobe. No findings of small  bowel obstruction. The appendix unremarkable.     The liver, pancreas, spleen, adrenal glands and kidneys have an  unremarkable noncontrast CT appearance. No hydronephrosis no abdominal  pelvic adenopathy by size criteria. Bladder is decompressed and cannot  be evaluated. No free intraperitoneal air is seen. No suspicious lytic  or blastic osseous lesions       Impression:      1.  No measurable loculated peripancreatic fluid collection is seen.  However, please note evaluation is suboptimal without intravenous  contrast. Mild to moderate intra and extra medical duct dilation status  post cholecystectomy. While findings may be post surgical, they remain  indeterminant given patient's elevated liver enzymes and lipase levels  and biliary obstruction cannot be excluded. Further evaluation with  abdominal MRI and MRCP should be considered if there is clinical concern  for biliary obstruction..  2.  If this patient is at increased risk for lung cancer, follow-up  chest CT in 12 months can be considered to ensure stability. If this  patient is not at increased risk for lung cancer, no further follow-up  is necessary per Fleischner criteria.  3.  Other findings as above           This report was finalized on 12/1/2024 11:48 AM by Dr. Deni Marie M.D on Workstation: [id number]               Pertinent Labs     Results from last 7 days   Lab Units 12/06/24  0404 12/05/24  0311 12/04/24  0542 12/03/24  0613   WBC 10*3/mm3 7.72 8.16 9.18 8.57   HEMOGLOBIN g/dL 12.6 12.2 13.1 12.8   PLATELETS 10*3/mm3 314 281 270  "302     Results from last 7 days   Lab Units 12/06/24  0404 12/05/24  0310 12/04/24  0542 12/03/24  0613   SODIUM mmol/L 139 138 142 142   POTASSIUM mmol/L 3.4* 3.3* 3.8 3.4*   CHLORIDE mmol/L 109* 108* 107 109*   CO2 mmol/L 21.0* 20.6* 21.0* 22.0   BUN mg/dL 6 6 8 9   CREATININE mg/dL 0.72 0.62 0.80 0.84   GLUCOSE mg/dL 88 89 94 96   Estimated Creatinine Clearance: 134.7 mL/min (by C-G formula based on SCr of 0.72 mg/dL).  Results from last 7 days   Lab Units 12/06/24  0404 12/05/24  0310 12/04/24  1510 12/04/24  0542   ALBUMIN g/dL 2.9* 2.8* 3.5 3.3*   BILIRUBIN mg/dL 1.4* 3.7* 2.1* 5.8*   ALK PHOS U/L 193* 234* 249* 247*   AST (SGOT) U/L 136* 177* 178* 242*   ALT (SGPT) U/L 201* 224* 272* 290*     Results from last 7 days   Lab Units 12/06/24  0404 12/05/24  0310 12/04/24  1510 12/04/24  0542 12/03/24  0613   CALCIUM mg/dL 8.1* 7.7*  --  8.4* 8.0*   ALBUMIN g/dL 2.9* 2.8* 3.5 3.3* 2.7*     Results from last 7 days   Lab Units 12/04/24  0542 12/03/24  0613 12/02/24  0700 12/01/24  1035   LIPASE U/L 504* 539* 1,846* 2,527*             Invalid input(s): \"LDLCALC\"        Test Results Pending at Discharge       Discharge Details        Discharge Medications        Changes to Medications        Instructions Start Date   pantoprazole 40 MG EC tablet  Commonly known as: Protonix  What changed:   medication strength  how much to take   40 mg, Oral, Daily             Continue These Medications        Instructions Start Date   aluminum-magnesium hydroxide-simethicone 200-200-20 MG/5ML suspension  Commonly known as: MAALOX   30 mL, Oral, 4 Times Daily Before Meals & Nightly      hydroCHLOROthiazide 12.5 MG tablet   12.5 mg, Daily      losartan 100 MG tablet  Commonly known as: COZAAR   100 mg, Daily      TRI-SPRINTEC PO   Daily      Zepbound 2.5 MG/0.5ML solution auto-injector  Generic drug: Tirzepatide-Weight Management   2.5 mg, Weekly               Allergies   Allergen Reactions    Penicillins Hives         Discharge " Disposition:  Home or Self Care    Discharge Diet:  Diet Order   Procedures    Diet: Regular/House; Fluid Consistency: Thin (IDDSI 0)       Discharge Activity:   Activity Instructions       Activity as Tolerated              CODE STATUS:    Code Status and Medical Interventions: CPR (Attempt to Resuscitate); Full Support   Ordered at: 12/01/24 1500     Level Of Support Discussed With:    Patient     Code Status (Patient has no pulse and is not breathing):    CPR (Attempt to Resuscitate)     Medical Interventions (Patient has pulse or is breathing):    Full Support       No future appointments.  Additional Instructions for the Follow-ups that You Need to Schedule       Discharge Follow-up with PCP   As directed       Currently Documented PCP:    Pretty Dean MD    PCP Phone Number:    [phone]     Follow Up Details: post-hospital follow up        Discharge Follow-up with Specified Provider: Gastroenterology as instructed   As directed      To: Gastroenterology as instructed               Follow-up Information       Pretty Dean MD .    Specialty: Family Medicine  Why: post-hospital follow up  Contact information:  [address] [place] [address]  [phone]                             Additional Instructions for the Follow-ups that You Need to Schedule       Discharge Follow-up with PCP   As directed       Currently Documented PCP:    Pretty Dean MD    PCP Phone Number:    [phone]     Follow Up Details: post-hospital follow up        Discharge Follow-up with Specified Provider: Gastroenterology as instructed   As directed      To: Gastroenterology as instructed            Time Spent on Discharge:  I spent greater than 30 minutes on this discharge activity which included: face-to-face encounter with the patient, reviewing the data in the system, coordination of the care with the nursing staff as well as consultants, documentation, and entering orders.       Lorena Oropeza,  MD [facility]  12/06/24  10:13 EST

## 2024-12-06 NOTE — PROGRESS NOTES
Continued Stay Note  [facility]     Patient Name: Racquel Degroot  MRN: [id number]  Today's Date: 12/6/2024    Admit Date: 12/1/2024    Plan: Home no needs   Discharge Plan       Row Name 12/06/24 1045       Plan    Plan Home no needs    Plan Comments Discharge order noted. Met with patient who confirmed DC plan is to return home. Stated her mother will assist as needed and will provide transportation at DC. Denies any needs/equipment.                   Discharge Codes    No documentation.                 Expected Discharge Date and Time       Expected Discharge Date Expected Discharge Time    Dec 6, 2024               Jadyn Mahoney RN

## 2024-12-06 NOTE — NURSING NOTE
Patient ate a turkey sandwich and some other foods from a regular diet about an hour ago. She is feeling good still and would like to go home. Her family is at bedside and she is up ad jessica.

## 2024-12-06 NOTE — PLAN OF CARE
Goal Outcome Evaluation:  Plan of Care Reviewed With: patient        Progress: improving  Outcome Evaluation: Slight discomfort but pain and nausea better. Hopes to get good results with labs so that she can be discharged. Up ad jessica. Afebrile and VS stable. Tolerating her full liquid diet

## 2024-12-06 NOTE — PROGRESS NOTES
Case Management Discharge Note      Final Note: Discharged home. Jadyn Mahoney RN         Selected Continued Care - Discharged on 12/6/2024 Admission date: 12/1/2024 - Discharge disposition: Home or Self Care         Transportation Services  Private: Car    Final Discharge Disposition Code: 01 - home or self-care

## 2024-12-07 NOTE — OUTREACH NOTE
Prep Survey      Flowsheet Row Responses   [facility] discharged [facility]? [place]   Is LACE score < 7 ? No   Eligibility Readm Mgmt   Discharge diagnosis Pancreatitis   Does the patient have one of the following disease processes/diagnoses(primary or secondary)? Other   Prep survey completed? Yes            Maite STEARNS - Registered Nurse

## 2024-12-09 ENCOUNTER — READMISSION MANAGEMENT (OUTPATIENT)
Dept: CALL CENTER | Facility: HOSPITAL | Age: 44
End: 2024-12-09
Payer: COMMERCIAL

## 2024-12-09 NOTE — PAYOR COMM NOTE
"Emiliano Mooney (44 y.o. Female)    PLEASE SEE ATTACHED FOR DC NOTICE   I RECD FAX FOR APPROVED DAYS 12/1-12/1 REQUEST TOTAL DAYS APPROVED 12/1-12/6  REF#[id number]  THANK YOU  MELIZA NICKERSON RN/ DEPT   [facility]  PH: [phone]  FAX:  [phone]     Date of Birth   1980    Social Security Number       Address   [address] [address]    Home Phone   [phone]    MRN   [id number]       Jain   None    Marital Status                               Admission Date   12/1/24    Admission Type   Urgent    Admitting Provider   Vitaly Lee MD    Attending Provider       Department, Room/Bed   [address], \[facility]\""/       Discharge Date   12/6/2024    Discharge Disposition   Home or Self Care    Discharge Destination                                 Attending Provider: (none)   Allergies: Penicillins    Isolation: None   Infection: None   Code Status: Prior    Ht: 164.6 cm (64.8\")   Wt: 127 kg (280 lb 13.9 oz)    Admission Cmt: None   Principal Problem: Pancreatitis [K85.90]                   Active Insurance as of 12/1/2024       Primary Coverage       Payor Plan Insurance Group Employer/Plan Group    [facility] [id number]       Payor Plan Address Payor Plan Phone Number Payor Plan Fax Number Effective Dates    [address]   1/1/2024 - None Entered    [place] [address]         Subscriber Name Subscriber Birth Date Member ID       EMILIANO MOONEY JINNY 1980 [id number]                     Emergency Contacts        (Rel.) Home Phone Work Phone Mobile Phone    KYLE MORALEZ (Spouse) [phone]              Cardale: NPI [id number]  Tax ID [id number]  Medication Administration Report for JeblorneEmiliano as of 12/2/24 through 12/6/24    Given  Hold  Not Given  Due  Canceled Entry  Other " "Actions  Time  Time  (Time)  Time  Time-Action    Discontinued  Completed  Future  MAR Hold  Linked    Medications  24    Completed Medications    aluminum-magnesium hydroxide-simethicone (MAALOX MAX) 400-400-40 MG/5ML suspension 30 mL  Dose: 30 mL  Freq: Once Route: PO  Start: 24 1045 End: 24 1049    Maximum 60 mL in 24 hours.  gadobenate dimeglumine (MULTIHANCE) injection 20 mL  Dose: 20 mL  Freq: Once in Imaging Route: IV  Start: 24 1630 End: 24 1552    Admin Instructions:  Based on patient request - if ordered for moderate or severe pain, provider allows for administration of a medication prescribed for a lower pain scale.  If given for pain, use the following pain scale:  Mild Pain = Pain Score of 1-3, CPOT 1-2  Moderate Pain = Pain Score of 4-6, CPOT 3-4  Severe Pain = Pain Score of 7-10, CPOT 5-8    ondansetron (ZOFRAN) injection 4 mg  Dose: 4 mg  Freq: Once Route: IV  Start: 24 1215 End: 24 1202    \"If multiple N/V medications ordered, use in the following order: Ondansetron, Prochlorperazine, Promethazine. Use PO unless patient refuses or patient unable to swallow.\"    potassium chloride (KLOR-CON M10) CR tablet 40 mEq  Dose: 40 mEq  Freq: Once Route: PO  Start: 24 1445 End: 24 1415  Admin Instructions:     Physician Progress Notes (last 5 days)        Lorena Oropeza MD at 24 3469              Name: Racquel Degroot ADMIT: 2024   : 1980  PCP: Pretty Dean MD    MRN: [id number] LOS: 4 days   AGE/SEX: 44 y.o. female  ROOM: [facility]     Subjective   Subjective   Sitting up in recliner, tolerating broth yesterday and this morning for breakfast.  Eager to try advancing her diet.      Objective   Objective   Vital Signs  Temp:  [96.8 °F (36 °C)-98.7 °F (37.1 °C)] 97.6 °F (36.4 °C)  Heart Rate:  [65-92] 65  Resp:  [16-20] 16  BP: (132-177)/() 141/75  SpO2:  [94 %-98 %] 96 %  on  Flow " (L/min) (Oxygen Therapy):  [3-6] 3;   Device (Oxygen Therapy): room air  Body mass index is 47.03 kg/m².  Physical Exam  Constitutional:       General: She is not in acute distress.     Appearance: Normal appearance. She is obese. She is not ill-appearing.   Cardiovascular:      Rate and Rhythm: Normal rate and regular rhythm.   Pulmonary:      Effort: Pulmonary effort is normal. No respiratory distress.      Breath sounds: Normal breath sounds. No wheezing.   Abdominal:      General: Abdomen is flat. Bowel sounds are normal.      Palpations: Abdomen is soft.      Tenderness: There is no abdominal tenderness.   Musculoskeletal:         General: No swelling or tenderness.      Right lower leg: No edema.      Left lower leg: No edema.   Skin:     General: Skin is warm and dry.   Neurological:      General: No focal deficit present.      Mental Status: She is alert and oriented to person, place, and time. Mental status is at baseline.         Results Review     I reviewed the patient's new clinical results.  Results from last 7 days   Lab Units 12/05/24 0311 12/04/24  0542 12/03/24  0613 12/02/24  0700   WBC 10*3/mm3 8.16 9.18 8.57 9.25   HEMOGLOBIN g/dL 12.2 13.1 12.8 13.7   PLATELETS 10*3/mm3 281 328 302 323     Results from last 7 days   Lab Units 12/05/24  0310 12/04/24  0542 12/03/24  0613 12/02/24  0700   SODIUM mmol/L 138 142 142 140   POTASSIUM mmol/L 3.3* 3.8 3.4* 3.3*   CHLORIDE mmol/L 108* 107 109* 106   CO2 mmol/L 20.6* 21.0* 22.0 25.3   BUN mg/dL 6 8 9 9   CREATININE mg/dL 0.62 0.80 0.84 0.85   GLUCOSE mg/dL 89 94 96 120*   Estimated Creatinine Clearance: 156.4 mL/min (by C-G formula based on SCr of 0.62 mg/dL).  Results from last 7 days   Lab Units 12/05/24 0310 12/04/24  1510 12/04/24  0542 12/03/24  0613   ALBUMIN g/dL 2.8* 3.5 3.3* 2.7*   BILIRUBIN mg/dL 3.7* 2.1* 5.8* 4.1*  4.2*   ALK PHOS U/L 234* 249* 247* 214*   AST (SGOT) U/L 177* 178* 242* 307*   ALT (SGPT) U/L 224* 272* 290* 304*     Results  "from last 7 days   Lab Units 12/05/24  0310 12/04/24  1510 12/04/24  0542 12/03/24  0613 12/02/24  0700   CALCIUM mg/dL 7.7*  --  8.4* 8.0* 8.5*   ALBUMIN g/dL 2.8* 3.5 3.3* 2.7* 3.3*     No results found for: \"COVID19\"  No results found for: \"HGBA1C\", \"POCGLU\"    FL ercp biliary duct only  Narrative: FL ERCP BILIARY DUCT ONLY-     Impression: 6 spot fluoroscopic images from ERCP. Please see procedure  note for further details. Fluoroscopy time 1 minute 28 seconds.  Reference air kerma 14 mGy.     This report was finalized on 12/4/2024 7:47 PM by Dr. Prem Flores M.D on Workstation: [id number]       Scheduled Medications  losartan, 100 mg, Oral, Daily  pantoprazole, 40 mg, Intravenous, Q AM  sodium chloride, 10 mL, Intravenous, Q12H    Infusions  sodium chloride, 100 mL/hr, Last Rate: 100 mL/hr (12/04/24 2137)    Diet  Diet: Liquid; Clear Liquid; Fluid Consistency: Thin (IDDSI 0)        I have personally reviewed:  [x]  Laboratory   []  Microbiology   []  Radiology   [x]  EKG/Telemetry   []  Cardiology/Vascular   []  Pathology   []  Records    Assessment/Plan     Active Hospital Problems    Diagnosis  POA    **Pancreatitis [K85.90]  Yes    HTN (hypertension) [I10]  Unknown    Obesity, Class III, BMI 40-49.9 (morbid obesity) [E66.01]  Unknown      Resolved Hospital Problems   No resolved problems to display.       44 y.o. female admitted with Pancreatitis.    Gallstone pancreatitis  Elevated LFTs  -Patient had her gallbladder removed approximately 10 years ago, s/p ERCP with sphincterotomy on 12/4, tolerating clear liquid diet, advance to full liquid diet today, she is hopeful for discharge home in the next day or 2  -Anticipate LFTs will improve over the next day or so    Hypokalemia  -replace with p.o. potassium    Hypertension  - cont losartan    Obesity- BMI 47    SCDs for DVT prophylaxis.  Full code.  Discussed with patient and nursing staff.  Anticipated discharge home, 1-2 days        Lorena Oropeza, " MD  [facility]  24  07:59 EST    I wore protective equipment throughout this patient encounter including gloves.  Hand hygiene was performed before donning protective equipment and after removal when leaving the room.    Expected Discharge Date and Time       Expected Discharge Date Expected Discharge Time    Dec 5, 2024              Copied text in this note has been reviewed and is accurate as of 24.       Electronically signed by Lorena Oropeza MD at 24 1740       Consult Notes (last 5 days)  Notes from 24 1621 through 24 1621   No notes of this type exist for this encounter.          Discharge Summary        Lorena Oropeza MD at 24 1013              Patient Name: Racquel Degroot  : 1980  MRN: [id number]    Date of Admission: 2024  Date of Discharge:  2024  Primary Care Physician: Pretty Dean MD      Chief Complaint:   Abdominal Pain      Discharge Diagnoses     Active Hospital Problems    Diagnosis  POA    **Pancreatitis [K85.90]  Yes    HTN (hypertension) [I10]  Unknown    Obesity, Class III, BMI 40-49.9 (morbid obesity) [E66.01]  Unknown      Resolved Hospital Problems   No resolved problems to display.        Hospital Course     Ms. Degroot is a 44 y.o. female with a history of hypertension and previous cholecystectomy who presented to [facility] initially complaining of abdominal pain.  Please see the admitting history and physical for further details.  She was found to have pancreatitis and was admitted to the hospital for further evaluation and treatment.  The patient was treated with bowel rest, IV fluids, pain medications.  And MRCP did show defect in the ampulla and GI was consulted and took the patient for an ERCP which did reveal retained stone.  The patient underwent sphincterotomy and clearance of common bile duct on 2024.  She was also noted to have gastritis during EGD portion of  procedure.  GI is recommending that she discharged on Protonix and follow-up with them in clinic.  She was tolerating a regular diet on the day of discharge.  Patient also follow-up with the PCP in a week for posthospital follow visit      Day of Discharge     Subjective:  Up and ambulating in the hallway.  Very ready to leave the hospital.  No significant pain.  Nausea and vomiting gone.    Physical Exam:  Temp:  [97.5 °F (36.4 °C)-98 °F (36.7 °C)] 97.5 °F (36.4 °C)  Heart Rate:  [68-71] 68  Resp:  [16-18] 18  BP: (137-160)/(80-96) 160/93  Body mass index is 47.03 kg/m².  Physical Exam  Constitutional:       General: She is not in acute distress.     Appearance: Normal appearance. She is obese. She is not ill-appearing.   Cardiovascular:      Rate and Rhythm: Normal rate and regular rhythm.   Pulmonary:      Effort: Pulmonary effort is normal. No respiratory distress.      Breath sounds: Normal breath sounds. No wheezing.   Abdominal:      General: Abdomen is flat. Bowel sounds are normal.      Palpations: Abdomen is soft.      Tenderness: There is no abdominal tenderness.   Musculoskeletal:         General: No swelling or tenderness.      Right lower leg: No edema.      Left lower leg: No edema.   Skin:     General: Skin is warm and dry.   Neurological:      General: No focal deficit present.      Mental Status: She is alert and oriented to person, place, and time. Mental status is at baseline.         Consultants     Consult Orders (all) (From admission, onward)       Start     Ordered    12/04/24 1458  Inpatient Gastroenterology Consult  Once        Specialty:  Gastroenterology  Provider:  Jesus Almanza MD    12/04/24 1501    12/01/24 1458  Inpatient Gastroenterology Consult  Once        Specialty:  Gastroenterology  Provider:  Jesus Almanza MD    12/01/24 1500                  Procedures     Imaging Results (All)       Procedure Component Value Units Date/Time    FL ercp biliary duct only [[id number]]  Collected: 12/04/24 1946     Updated: 12/04/24 1950    Narrative:      FL ERCP BILIARY DUCT ONLY-       Impression:      6 spot fluoroscopic images from ERCP. Please see procedure  note for further details. Fluoroscopy time 1 minute 28 seconds.  Reference air kerma 14 mGy.     This report was finalized on 12/4/2024 7:47 PM by Dr. Prem Flores M.D on Workstation: [id number]       MRI abdomen w wo contrast mrcp [[id number]] Collected: 12/02/24 1635     Updated: 12/02/24 1700    Narrative:      MRI ABDOMEN WITH AND WITHOUT CONTRAST     HISTORY: Pancreatitis with biliary duct dilation.     TECHNIQUE: Multiplanar multisequence MRI of the abdomen was performed  before and after the administration of IV Multihance.      COMPARISON: CT abdomen pelvis 12/1/2024.     FINDINGS: Preserved pancreas parenchymal volume. Nondilated main  pancreatic duct. Conventional main pancreatic ductal anatomy. Mildly  decreased intrinsic T1 hyperintense signal in the pancreatic head  (series 12/image 37). Peripancreatic T2 hyperintense signal/edema and  small amount of nonorganized free fluid adjacent to the pancreatic head  (series 7/image 22). Pancreas parenchyma enhances in its entirety. No  organized pancreatic or peripancreatic fluid collection. No focal  pancreatic lesion. Superimposed pancreatitis does partially limit  evaluation for underlying lesion. Patent regional vasculature without  surrounding soft tissue or appreciable pseudoaneurysm. Evaluation for  pseudoaneurysm is limited via MRI. No abdominal lymphadenopathy.     Cholecystectomy. Mildly dilated biliary tree with the common duct  measuring up to 1.1 cm (series 9/image 9). Common bile duct remains  dilated to the level of the ampulla. No evidence of choledocholithiasis.  No abnormal enhancement or soft tissue prominence at the level of the  ampulla.     Noncirrhotic liver morphology. No significant steatosis or iron  overload. Conventional hepatic arterial anatomy. Patent  hepatic and  portal veins. No focal hepatic lesion.     Normal spleen, adrenal glands, kidneys and visualized bowel. No  enhancing osseous lesion.             Impression:      1. Acute interstitial edematous pancreatitis centered at the pancreatic  head. No organized pancreatic or peripancreatic fluid collection.  2. Post cholecystectomy. Mildly dilated biliary tree. No evidence of  choledocholithiasis. Biliary duct dilation is favored secondary to  reservoir effect post cholecystectomy.        This report was finalized on 12/2/2024 4:57 PM by Dr. Prem Flores M.D on Workstation: [id number]       CT Abdomen Pelvis Without Contrast [[id number]] Collected: 12/01/24 1137     Updated: 12/01/24 1151    Narrative:      CT ABDOMEN AND PELVIS WITHOUT IV CONTRAST     HISTORY: Abdominal pain     TECHNIQUE: Radiation dose reduction techniques were utilized, including  automated exposure control and exposure modulation based on body size.   3 mm images were obtained through the abdomen and pelvis without IV  contrast.     COMPARISON: None     FINDINGS:  Sub-6 mm pulm nodule in the right middle lobe. No findings of small  bowel obstruction. The appendix unremarkable.     The liver, pancreas, spleen, adrenal glands and kidneys have an  unremarkable noncontrast CT appearance. No hydronephrosis no abdominal  pelvic adenopathy by size criteria. Bladder is decompressed and cannot  be evaluated. No free intraperitoneal air is seen. No suspicious lytic  or blastic osseous lesions       Impression:      1.  No measurable loculated peripancreatic fluid collection is seen.  However, please note evaluation is suboptimal without intravenous  contrast. Mild to moderate intra and extra medical duct dilation status  post cholecystectomy. While findings may be post surgical, they remain  indeterminant given patient's elevated liver enzymes and lipase levels  and biliary obstruction cannot be excluded. Further evaluation with  abdominal  "MRI and MRCP should be considered if there is clinical concern  for biliary obstruction..  2.  If this patient is at increased risk for lung cancer, follow-up  chest CT in 12 months can be considered to ensure stability. If this  patient is not at increased risk for lung cancer, no further follow-up  is necessary per Fleischner criteria.  3.  Other findings as above           This report was finalized on 12/1/2024 11:48 AM by Dr. Deni Marie M.D on Workstation: [id number]               Pertinent Labs     Results from last 7 days   Lab Units 12/06/24 0404 12/05/24 0311 12/04/24 0542 12/03/24  0613   WBC 10*3/mm3 7.72 8.16 9.18 8.57   HEMOGLOBIN g/dL 12.6 12.2 13.1 12.8   PLATELETS 10*3/mm3 314 281 328 302     Results from last 7 days   Lab Units 12/06/24 0404 12/05/24 0310 12/04/24 0542 12/03/24 0613   SODIUM mmol/L 139 138 142 142   POTASSIUM mmol/L 3.4* 3.3* 3.8 3.4*   CHLORIDE mmol/L 109* 108* 107 109*   CO2 mmol/L 21.0* 20.6* 21.0* 22.0   BUN mg/dL 6 6 8 9   CREATININE mg/dL 0.72 0.62 0.80 0.84   GLUCOSE mg/dL 88 89 94 96   Estimated Creatinine Clearance: 134.7 mL/min (by C-G formula based on SCr of 0.72 mg/dL).  Results from last 7 days   Lab Units 12/06/24 0404 12/05/24 0310 12/04/24 1510 12/04/24  0542   ALBUMIN g/dL 2.9* 2.8* 3.5 3.3*   BILIRUBIN mg/dL 1.4* 3.7* 2.1* 5.8*   ALK PHOS U/L 193* 234* 249* 247*   AST (SGOT) U/L 136* 177* 178* 242*   ALT (SGPT) U/L 201* 224* 272* 290*     Results from last 7 days   Lab Units 12/06/24 0404 12/05/24 0310 12/04/24 1510 12/04/24  0542 12/03/24  0613   CALCIUM mg/dL 8.1* 7.7*  --  8.4* 8.0*   ALBUMIN g/dL 2.9* 2.8* 3.5 3.3* 2.7*     Results from last 7 days   Lab Units 12/04/24  0542 12/03/24  0613 12/02/24  0700 12/01/24  1035   LIPASE U/L 504* 539* 1,846* 2,527*             Invalid input(s): \"LDLCALC\"        Test Results Pending at Discharge       Discharge Details        Discharge Medications        Changes to Medications        Instructions Start Date "   pantoprazole 40 MG EC tablet  Commonly known as: Protonix  What changed:   medication strength  how much to take   40 mg, Oral, Daily             Continue These Medications        Instructions Start Date   aluminum-magnesium hydroxide-simethicone 200-200-20 MG/5ML suspension  Commonly known as: MAALOX   30 mL, Oral, 4 Times Daily Before Meals & Nightly      hydroCHLOROthiazide 12.5 MG tablet   12.5 mg, Daily      losartan 100 MG tablet  Commonly known as: COZAAR   100 mg, Daily      TRI-SPRINTEC PO   Daily      Zepbound 2.5 MG/0.5ML solution auto-injector  Generic drug: Tirzepatide-Weight Management   2.5 mg, Weekly               Allergies   Allergen Reactions    Penicillins Hives         Discharge Disposition:  Home or Self Care    Discharge Diet:  Diet Order   Procedures    Diet: Regular/House; Fluid Consistency: Thin (IDDSI 0)       Discharge Activity:   Activity Instructions       Activity as Tolerated              CODE STATUS:    Code Status and Medical Interventions: CPR (Attempt to Resuscitate); Full Support   Ordered at: 12/01/24 1500     Level Of Support Discussed With:    Patient     Code Status (Patient has no pulse and is not breathing):    CPR (Attempt to Resuscitate)     Medical Interventions (Patient has pulse or is breathing):    Full Support       No future appointments.  Additional Instructions for the Follow-ups that You Need to Schedule       Discharge Follow-up with PCP   As directed       Currently Documented PCP:    Pretty Dean MD    PCP Phone Number:    [phone]     Follow Up Details: post-hospital follow up        Discharge Follow-up with Specified Provider: Gastroenterology as instructed   As directed      To: Gastroenterology as instructed               Follow-up Information       Pretyt Dean MD .    Specialty: Family Medicine  Why: post-hospital follow up  Contact information:  [address] [place] [address]  [phone]                              Additional Instructions for the Follow-ups that You Need to Schedule       Discharge Follow-up with PCP   As directed       Currently Documented PCP:    Pretty Dean MD    PCP Phone Number:    [phone]     Follow Up Details: post-hospital follow up        Discharge Follow-up with Specified Provider: Gastroenterology as instructed   As directed      To: Gastroenterology as instructed            Time Spent on Discharge:  I spent greater than 30 minutes on this discharge activity which included: face-to-face encounter with the patient, reviewing the data in the system, coordination of the care with the nursing staff as well as consultants, documentation, and entering orders.       Lorena Oropeza MD  [facility]  12/06/24  10:13 EST                Electronically signed by Lorena Oropeza MD at 12/06/24 1829

## 2024-12-09 NOTE — OUTREACH NOTE
Medical Week 1 Survey      Flowsheet Row Responses   [facility] patient discharged from? [place]   Does the patient have one of the following disease processes/diagnoses(primary or secondary)? Other   Week 1 attempt successful? Yes   Call start time 1621   Call end time 1626   Discharge diagnosis Pancreatitis   Meds reviewed with patient/caregiver? Yes   Is the patient having any side effects they believe may be caused by any medication additions or changes? No   Does the patient have all medications ordered at discharge? Yes   Is the patient taking all medications as directed (includes completed medication regime)? Yes   Does the patient have a primary care provider?  Yes   Does the patient have an appointment with their PCP within 7 days of discharge? Greater than 7 days   Comments regarding PCP 12/16/24   Nursing Interventions Verified appointment date/time/provider   Has the patient kept scheduled appointments due by today? N/A   Has home health visited the patient within 72 hours of discharge? N/A   Psychosocial issues? No   Did the patient receive a copy of their discharge instructions? Yes   Nursing interventions Reviewed instructions with patient   What is the patient's perception of their health status since discharge? Improving   Is the patient/caregiver able to teach back the hierarchy of who to call/visit for symptoms/problems? PCP, Specialist, Home health nurse, Urgent Care, ED, 911 Yes   Week 1 call completed? Yes   Graduated Yes   Would this patient benefit from a Referral to Amb Social Work? No   Is the patient interested in additional calls from an ambulatory ? No   Graduated/Revoked comments Pt reports doing well, no concerns at this time   Call end time 1626            Samaria STEPHENS - Registered Nurse

## 2025-01-28 ENCOUNTER — TELEPHONE (OUTPATIENT)
Dept: GASTROENTEROLOGY | Facility: CLINIC | Age: 45
End: 2025-01-28
Payer: COMMERCIAL

## 2025-01-28 NOTE — TELEPHONE ENCOUNTER
Okay to resume both medications from my standpoint as her episode of pancreatitis was likely secondary to bile duct abnormalities.  She should monitor for any abdominal pain.  I would encourage her to discuss this more thoroughly with her primary care before restarting as well.

## 2025-01-28 NOTE — TELEPHONE ENCOUNTER
Hub staff attempted to follow warm transfer process and was unsuccessful     Caller: Racquel Degroot    Relationship to patient: Self    Best call back number: [phone]     Patient is needing: PT IS CALLING IN REGARDS TO NEEDING A CLEARANCE TO GO BACK ON HER MEDICATION. CHANTELLE CANCINO SAW PT WHILE PT WAS IN ED. PLEASE CALL PT TO ADVISE ON NEXT STEPS FOR CLEARANCE

## 2025-01-28 NOTE — TELEPHONE ENCOUNTER
Called pt and advised of Latonia BURR's note.  Pt verbalized understanding.    Pt states she has discussed this with her PCP also.

## 2025-01-28 NOTE — TELEPHONE ENCOUNTER
Called pt, pt states she has been holding Zepbound and HCTZ since hospitalization in Dec.     Update sent to L[facility].

## (undated) DEVICE — ERBE NESSY®PLATE 170 SPLIT; 168CM²; CABLE 3M: Brand: ERBE

## (undated) DEVICE — Device

## (undated) DEVICE — SPHINCTEROTOME: Brand: HYDRATOME RX 44

## (undated) DEVICE — BLCK/BITE BLOX W/DENTL/RIM W/STRAP 54F

## (undated) DEVICE — SINGLE USE DISTAL COVER MAJ-2315: Brand: SINGLE USE DISTAL COVER

## (undated) DEVICE — SENSR O2 OXIMAX FNGR A/ 18IN NONSTR

## (undated) DEVICE — LN SMPL CO2 SHTRM SD STREAM W/M LUER

## (undated) DEVICE — ADAPT CLN BIOGUARD AIR/H2O DISP

## (undated) DEVICE — CANN O2 ETCO2 FITS ALL CONN CO2 SMPL A/ 7IN DISP LF

## (undated) DEVICE — DEV LK WIREGUIDE FUSN OLYMP SCP

## (undated) DEVICE — KT ORCA ORCAPOD DISP STRL

## (undated) DEVICE — RETRIEVAL BALLOON CATHETER: Brand: EXTRACTOR™ PRO RX

## (undated) DEVICE — TUBING, SUCTION, 1/4" X 10', STRAIGHT: Brand: MEDLINE